# Patient Record
Sex: FEMALE | Race: BLACK OR AFRICAN AMERICAN | NOT HISPANIC OR LATINO | Employment: FULL TIME
[De-identification: names, ages, dates, MRNs, and addresses within clinical notes are randomized per-mention and may not be internally consistent; named-entity substitution may affect disease eponyms.]

---

## 2017-12-31 ENCOUNTER — HEALTH MAINTENANCE LETTER (OUTPATIENT)
Age: 27
End: 2017-12-31

## 2020-08-31 ENCOUNTER — HOSPITAL ENCOUNTER (INPATIENT)
Facility: CLINIC | Age: 30
LOS: 4 days | Discharge: HOME | End: 2020-09-04
Attending: EMERGENCY MEDICINE | Admitting: PSYCHIATRY & NEUROLOGY
Payer: MEDICAID

## 2020-08-31 ENCOUNTER — TELEPHONE (OUTPATIENT)
Dept: BEHAVIORAL HEALTH | Facility: CLINIC | Age: 30
End: 2020-08-31

## 2020-08-31 DIAGNOSIS — R45.851 SUICIDAL IDEATION: ICD-10-CM

## 2020-08-31 DIAGNOSIS — Z03.818 ENCNTR FOR OBS FOR SUSP EXPSR TO OTH BIOLG AGENTS RULED OUT: ICD-10-CM

## 2020-08-31 DIAGNOSIS — F10.120 ALCOHOL ABUSE WITH UNCOMPLICATED INTOXICATION (H): ICD-10-CM

## 2020-08-31 DIAGNOSIS — F39 MILD MOOD DISORDER (H): ICD-10-CM

## 2020-08-31 DIAGNOSIS — F10.920 ALCOHOLIC INTOXICATION WITHOUT COMPLICATION (H): ICD-10-CM

## 2020-08-31 LAB
ALCOHOL BREATH TEST: 0.29 (ref 0–0.01)
AMPHETAMINES UR QL SCN: NEGATIVE
BARBITURATES UR QL: NEGATIVE
BENZODIAZ UR QL: NEGATIVE
CANNABINOIDS UR QL SCN: NEGATIVE
COCAINE UR QL: POSITIVE
ETHANOL UR QL SCN: POSITIVE
HCG UR QL: NEGATIVE
LABORATORY COMMENT REPORT: NORMAL
OPIATES UR QL SCN: NEGATIVE
SARS-COV-2 RNA SPEC QL NAA+PROBE: NEGATIVE
SARS-COV-2 RNA SPEC QL NAA+PROBE: NORMAL
SPECIMEN SOURCE: NORMAL
SPECIMEN SOURCE: NORMAL

## 2020-08-31 PROCEDURE — HZ2ZZZZ DETOXIFICATION SERVICES FOR SUBSTANCE ABUSE TREATMENT: ICD-10-PCS | Performed by: PSYCHIATRY & NEUROLOGY

## 2020-08-31 PROCEDURE — U0003 INFECTIOUS AGENT DETECTION BY NUCLEIC ACID (DNA OR RNA); SEVERE ACUTE RESPIRATORY SYNDROME CORONAVIRUS 2 (SARS-COV-2) (CORONAVIRUS DISEASE [COVID-19]), AMPLIFIED PROBE TECHNIQUE, MAKING USE OF HIGH THROUGHPUT TECHNOLOGIES AS DESCRIBED BY CMS-2020-01-R: HCPCS | Performed by: EMERGENCY MEDICINE

## 2020-08-31 PROCEDURE — 99285 EMERGENCY DEPT VISIT HI MDM: CPT | Mod: Z6 | Performed by: EMERGENCY MEDICINE

## 2020-08-31 PROCEDURE — 81025 URINE PREGNANCY TEST: CPT | Performed by: EMERGENCY MEDICINE

## 2020-08-31 PROCEDURE — 80320 DRUG SCREEN QUANTALCOHOLS: CPT | Performed by: EMERGENCY MEDICINE

## 2020-08-31 PROCEDURE — 90791 PSYCH DIAGNOSTIC EVALUATION: CPT

## 2020-08-31 PROCEDURE — 80307 DRUG TEST PRSMV CHEM ANLYZR: CPT | Performed by: EMERGENCY MEDICINE

## 2020-08-31 PROCEDURE — 12400001 ZZH R&B MH UMMC

## 2020-08-31 PROCEDURE — C9803 HOPD COVID-19 SPEC COLLECT: HCPCS | Performed by: EMERGENCY MEDICINE

## 2020-08-31 PROCEDURE — 82075 ASSAY OF BREATH ETHANOL: CPT | Performed by: EMERGENCY MEDICINE

## 2020-08-31 PROCEDURE — 99285 EMERGENCY DEPT VISIT HI MDM: CPT | Mod: 25 | Performed by: EMERGENCY MEDICINE

## 2020-08-31 RX ORDER — TRAZODONE HYDROCHLORIDE 50 MG/1
50 TABLET, FILM COATED ORAL
Status: DISCONTINUED | OUTPATIENT
Start: 2020-08-31 | End: 2020-09-04 | Stop reason: HOSPADM

## 2020-08-31 RX ORDER — LANOLIN ALCOHOL/MO/W.PET/CERES
100 CREAM (GRAM) TOPICAL DAILY
Status: DISCONTINUED | OUTPATIENT
Start: 2020-09-01 | End: 2020-09-04 | Stop reason: HOSPADM

## 2020-08-31 RX ORDER — BISACODYL 10 MG
10 SUPPOSITORY, RECTAL RECTAL DAILY PRN
Status: DISCONTINUED | OUTPATIENT
Start: 2020-08-31 | End: 2020-09-04 | Stop reason: HOSPADM

## 2020-08-31 RX ORDER — ALUMINA, MAGNESIA, AND SIMETHICONE 2400; 2400; 240 MG/30ML; MG/30ML; MG/30ML
30 SUSPENSION ORAL EVERY 4 HOURS PRN
Status: DISCONTINUED | OUTPATIENT
Start: 2020-08-31 | End: 2020-09-04 | Stop reason: HOSPADM

## 2020-08-31 RX ORDER — MULTIPLE VITAMINS W/ MINERALS TAB 9MG-400MCG
1 TAB ORAL DAILY
Status: DISCONTINUED | OUTPATIENT
Start: 2020-09-01 | End: 2020-09-04 | Stop reason: HOSPADM

## 2020-08-31 RX ORDER — LANOLIN ALCOHOL/MO/W.PET/CERES
100 CREAM (GRAM) TOPICAL DAILY
Status: CANCELLED | OUTPATIENT
Start: 2020-08-31

## 2020-08-31 RX ORDER — LORAZEPAM 1 MG/1
1-4 TABLET ORAL EVERY 30 MIN PRN
Status: DISCONTINUED | OUTPATIENT
Start: 2020-08-31 | End: 2020-09-03

## 2020-08-31 RX ORDER — ONDANSETRON 4 MG/1
4 TABLET, ORALLY DISINTEGRATING ORAL EVERY 6 HOURS PRN
Status: DISCONTINUED | OUTPATIENT
Start: 2020-08-31 | End: 2020-09-04 | Stop reason: HOSPADM

## 2020-08-31 RX ORDER — OLANZAPINE 10 MG/2ML
10 INJECTION, POWDER, FOR SOLUTION INTRAMUSCULAR
Status: DISCONTINUED | OUTPATIENT
Start: 2020-08-31 | End: 2020-09-04 | Stop reason: HOSPADM

## 2020-08-31 RX ORDER — HYDROXYZINE HYDROCHLORIDE 25 MG/1
25 TABLET, FILM COATED ORAL EVERY 4 HOURS PRN
Status: DISCONTINUED | OUTPATIENT
Start: 2020-08-31 | End: 2020-09-04 | Stop reason: HOSPADM

## 2020-08-31 RX ORDER — MULTIPLE VITAMINS W/ MINERALS TAB 9MG-400MCG
1 TAB ORAL DAILY
Status: CANCELLED | OUTPATIENT
Start: 2020-08-31

## 2020-08-31 RX ORDER — ACETAMINOPHEN 325 MG/1
650 TABLET ORAL EVERY 4 HOURS PRN
Status: CANCELLED | OUTPATIENT
Start: 2020-08-31

## 2020-08-31 RX ORDER — DIAZEPAM 5 MG
5-20 TABLET ORAL EVERY 30 MIN PRN
Status: CANCELLED | OUTPATIENT
Start: 2020-08-31

## 2020-08-31 RX ORDER — FOLIC ACID 1 MG/1
1 TABLET ORAL DAILY
Status: CANCELLED | OUTPATIENT
Start: 2020-08-31

## 2020-08-31 RX ORDER — OLANZAPINE 10 MG/1
10 TABLET ORAL
Status: DISCONTINUED | OUTPATIENT
Start: 2020-08-31 | End: 2020-09-04 | Stop reason: HOSPADM

## 2020-08-31 RX ORDER — FOLIC ACID 1 MG/1
1 TABLET ORAL DAILY
Status: DISCONTINUED | OUTPATIENT
Start: 2020-09-01 | End: 2020-09-04 | Stop reason: HOSPADM

## 2020-08-31 ASSESSMENT — ACTIVITIES OF DAILY LIVING (ADL)
TOILETING: 0-->INDEPENDENT
SWALLOWING: 0-->SWALLOWS FOODS/LIQUIDS WITHOUT DIFFICULTY
RETIRED_EATING: 0-->INDEPENDENT
DRESS: 0-->INDEPENDENT
RETIRED_COMMUNICATION: 0-->UNDERSTANDS/COMMUNICATES WITHOUT DIFFICULTY
BATHING: 0-->INDEPENDENT

## 2020-08-31 ASSESSMENT — ENCOUNTER SYMPTOMS
VOMITING: 0
DIFFICULTY URINATING: 0
NECK PAIN: 0
NERVOUS/ANXIOUS: 0
FEVER: 0
HEADACHES: 0
NAUSEA: 0
SHORTNESS OF BREATH: 0
BACK PAIN: 0
ABDOMINAL PAIN: 0
CHILLS: 0

## 2020-08-31 ASSESSMENT — MIFFLIN-ST. JEOR: SCORE: 1394.04

## 2020-08-31 NOTE — ED NOTES
ED to Behavioral Floor Handoff    SITUATION  Isha Bloom is a 30 year old female who speaks English and lives in a home with others The patient arrived in the ED by private car from friend's home with a complaint of Suicidal (Ideation and plan)  .The patient's current symptoms started/worsened 4 month(s) ago and during this time the symptoms have increased.   In the ED, pt was diagnosed with   Final diagnoses:   Alcoholic intoxication without complication (H)   Suicidal ideation        Initial vitals were: BP: 114/67  Pulse: 78  Resp: 16  Weight: 66.8 kg (147 lb 3.2 oz)  SpO2: 95 %   --------  Is the patient diabetic? No   If yes, last blood glucose? --     If yes, was this treated in the ED? --  --------  Is the patient inebriated (ETOH) Yes or Impaired on other substances? No  MSSA done? Yes  Last MSSA score: 3    Were withdrawal symptoms treated? No  Does the patient have a seizure history? No. If yes, date of most recent seizure--  --------  Is the patient patient experiencing suicidal ideation? reports the following suicide factors: pt has a plan but states that she can contract for safety    Homicidal ideation? denies current or recent homicidal ideation or behaviors.    Self-injurious behavior/urges? denies current or recent self injurious behavior or ideation.  ------  Was pt aggressive in the ED No  Was a code called No  Is the pt now cooperative? Yes  -------  Meds given in ED: Medications - No data to display   Family present during ED course? Yes  Family currently present? No    BACKGROUND  Does the patient have a cognitive impairment or developmental disability? No  Allergies: No Known Allergies.   Social demographics are   Social History     Socioeconomic History     Marital status: Single     Spouse name: None     Number of children: None     Years of education: None     Highest education level: None   Occupational History     None   Social Needs     Financial resource strain: None     Food  insecurity     Worry: None     Inability: None     Transportation needs     Medical: None     Non-medical: None   Tobacco Use     Smoking status: Former Smoker     Smokeless tobacco: Never Used   Substance and Sexual Activity     Alcohol use: Yes     Comment: 2-3 drinks per day     Drug use: Yes     Frequency: 2.0 times per week     Types: Cocaine, Marijuana     Comment: cocaine and marijuana in past 48 hours,  none today     Sexual activity: Yes   Lifestyle     Physical activity     Days per week: None     Minutes per session: None     Stress: None   Relationships     Social connections     Talks on phone: None     Gets together: None     Attends [employer] service: None     Active member of club or organization: None     Attends meetings of clubs or organizations: None     Relationship status: None     Intimate partner violence     Fear of current or ex partner: None     Emotionally abused: None     Physically abused: None     Forced sexual activity: None   Other Topics Concern     None   Social History Narrative     None        ASSESSMENT  Labs results   Labs Ordered and Resulted from Time of ED Arrival Up to the Time of Departure from the ED   DRUG ABUSE SCREEN 6 CHEM DEP URINE ([facility]) - Abnormal; Notable for the following components:       Result Value    Cocaine Qual Urine Positive (*)     Ethanol Qual Urine Positive (*)     All other components within normal limits   ALCOHOL BREATH TEST POCT - Abnormal; Notable for the following components:    Alcohol Breath Test 0.291 (*)     All other components within normal limits   HCG QUALITATIVE URINE   COVID-19 VIRUS (CORONAVIRUS) BY PCR   SARS-COV-2 (COVID-19) VIRUS RT-PCR      Imaging Studies: No results found for this or any previous visit (from the past 24 hour(s)).   Most recent vital signs BP 95/55   Pulse 71   Resp 16   Wt 66.8 kg (147 lb 3.2 oz)   LMP 07/27/2020   SpO2 95%   BMI 24.50 kg/m     Abnormal labs/tests/findings requiring intervention:---   Pain  control: pt had none  Nausea control: pt had none    RECOMMENDATION  Are any infection precautions needed (MRSA, VRE, etc.)? No If yes, what infection? --  ---  Does the patient have mobility issues? independently. If yes, what device does the pt use? ---  ---  Is patient on 72 hour hold or commitment? No If on 72 hour hold, have hold and rights been given to patient? N/A  Are admitting orders written if after 10 p.m. ?N/A  Tasks needing to be completed:---     Glenna Cox, RN   ascom-- vocera   [phone] [facility]   [phone] [facility]

## 2020-08-31 NOTE — ED PROVIDER NOTES
[facility] EMERGENCY DEPARTMENT ([facility])     August 31, 2020  History     Chief Complaint   Patient presents with     Suicidal     Ideation and plan     HPI  Isha Bloom is a 30 year old female with a medical history significant for pyelonephritis and alcohol abuse who presents the ED today complaining of suicidal ideation.  Patient states she has had worsening depression over the past several weeks.  She is also drinking alcohol almost daily and using cocaine.  He does not have a concrete plan, but states she may drink herself to death.    On arrival, she is intoxicated, last drink approximately 1 hour prior to arrival.  She denies any homicidal ideations, hallucinations, headache, confusion, and has no other medical complaints.    I have reviewed the Medications, Allergies, Past Medical and Surgical History, and Social History in the Pounce system.  PAST MEDICAL HISTORY: History reviewed. No pertinent past medical history.    PAST SURGICAL HISTORY: History reviewed. No pertinent surgical history.    Past medical history, past surgical history, medications, and allergies were reviewed with the patient. Additional pertinent items: None    FAMILY HISTORY: History reviewed. No pertinent family history.    SOCIAL HISTORY:   Social History     Tobacco Use     Smoking status: Former Smoker     Smokeless tobacco: Never Used   Substance Use Topics     Alcohol use: Yes     Comment: 2-3 drinks per day     Social history was reviewed with the patient. Additional pertinent items: None      Patient's Medications   New Prescriptions    No medications on file   Previous Medications    No medications on file   Modified Medications    No medications on file   Discontinued Medications    METHYLPHENIDATE HCL (CONCERTA PO)    Take 36 mg by mouth        No Known Allergies     Review of Systems   Constitutional: Negative for chills and fever.   Respiratory: Negative for shortness of breath.    Cardiovascular: Negative for  chest pain.   Gastrointestinal: Negative for abdominal pain, nausea and vomiting.   Genitourinary: Negative for difficulty urinating.   Musculoskeletal: Negative for back pain and neck pain.   Neurological: Negative for headaches.   Psychiatric/Behavioral: Positive for suicidal ideas. The patient is not nervous/anxious.      A complete review of systems was performed with pertinent positives and negatives noted in the HPI, and all other systems negative.    Physical Exam   BP: 114/67  Pulse: 78  Resp: 16  Weight: 66.8 kg (147 lb 3.2 oz)  SpO2: 95 %      Physical Exam  Vitals signs and nursing note reviewed.   Constitutional:       General: She is not in acute distress.     Appearance: Normal appearance.      Comments: Clinically intoxicated, etoh smell     HENT:      Head: Normocephalic.      Nose: Nose normal.   Eyes:      Pupils: Pupils are equal, round, and reactive to light.   Neck:      Musculoskeletal: Normal range of motion.   Cardiovascular:      Rate and Rhythm: Normal rate and regular rhythm.   Pulmonary:      Effort: Pulmonary effort is normal.   Abdominal:      General: There is no distension.   Musculoskeletal: Normal range of motion.         General: No deformity.   Skin:     General: Skin is warm.   Neurological:      Mental Status: She is alert and oriented to person, place, and time.   Psychiatric:         Attention and Perception: Attention normal.         Mood and Affect: Mood is depressed.         Speech: Speech is slurred.         Behavior: Behavior is cooperative.         Thought Content: Thought content includes suicidal ideation. Thought content includes suicidal plan.         ED Course               Results for orders placed or performed during the hospital encounter of 08/31/20 (from the past 24 hour(s))   Alcohol breath test POCT   Result Value Ref Range    Alcohol Breath Test 0.291 (A) 0.00 - 0.01   Drug abuse screen 6 urine (tox)   Result Value Ref Range    Amphetamine Qual Urine  Negative NEG^Negative    Barbiturates Qual Urine Negative NEG^Negative    Benzodiazepine Qual Urine Negative NEG^Negative    Cannabinoids Qual Urine Negative NEG^Negative    Cocaine Qual Urine Positive (A) NEG^Negative    Ethanol Qual Urine Positive (A) NEG^Negative    Opiates Qualitative Urine Negative NEG^Negative   HCG qualitative urine   Result Value Ref Range    HCG Qual Urine Negative NEG^Negative     Medications - No data to display          Assessments & Plan (with Medical Decision Making)   Patient presents intoxicated on alcohol with suicidal thoughts.  Plan to drink herself to death.  On arrival, blood alcohol level is 0.291.  UDS positive for alcohol and cocaine.  Patient will need to sober while in the emergency department.  Anticipate mental health assessment in the morning.  Will sign out to the a.m. provider.    I have reviewed the nursing notes.    I have reviewed the findings, diagnosis, plan and need for follow up with the patient.    New Prescriptions    No medications on file       Final diagnoses:   Alcoholic intoxication without complication (H)   Suicidal ideation       8/31/2020   [facility], [place], EMERGENCY DEPARTMENT     Sajan Brandt DO  09/01/20 0103

## 2020-08-31 NOTE — PHARMACY-ADMISSION MEDICATION HISTORY
Admission Medication History Completed by Pharmacy    See [facility] Admission Navigator for allergy information, preferred outpatient pharmacy, prior to admission medications and immunization status.     Medication history sources:  patient via iPad interview    Changes made to PTA medication list (reason)  Added: none  Deleted: none  Changed: none    Additional medication history information:   - Patient denies taking/being prescribed prescription medications and over-the-counter (OTC) products such as vitamins, sleep aids, etc.    - patient reports finishing an antibiotic course ~3 weeks ago for an infection she had on her hand.    Prior to Admission medications    Not on File     Date completed: 08/31/20    Medication history completed by:   Marshall Gray, uGstavo  [facility]  Emergency Department: Ascom *[phone]

## 2020-08-31 NOTE — ED NOTES
Assumed care of pt.  Report received from BRIDGET Manzanares.  Pt sleeping.  Respirations easy, regular, non-labored.  Skin w/d, no discoloration.

## 2020-08-31 NOTE — ED NOTES
Pt arrived from triage to ED hallway. Searched by PA and personal belongings placed in secure locker. Pt informed what to expect while in the ED. Pt acknowledged to understanding process. Due to reporting SI, 1:1 started.
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
## 2020-09-01 LAB
ALBUMIN SERPL-MCNC: 3.4 G/DL (ref 3.4–5)
ALP SERPL-CCNC: 58 U/L (ref 40–150)
ALT SERPL W P-5'-P-CCNC: 19 U/L (ref 0–50)
ANION GAP SERPL CALCULATED.3IONS-SCNC: 7 MMOL/L (ref 3–14)
AST SERPL W P-5'-P-CCNC: 23 U/L (ref 0–45)
BILIRUB SERPL-MCNC: 0.7 MG/DL (ref 0.2–1.3)
BUN SERPL-MCNC: 12 MG/DL (ref 7–30)
CALCIUM SERPL-MCNC: 8.8 MG/DL (ref 8.5–10.1)
CHLORIDE SERPL-SCNC: 103 MMOL/L (ref 94–109)
CHOLEST SERPL-MCNC: 210 MG/DL
CO2 SERPL-SCNC: 30 MMOL/L (ref 20–32)
CREAT SERPL-MCNC: 0.92 MG/DL (ref 0.52–1.04)
DEPRECATED CALCIDIOL+CALCIFEROL SERPL-MC: 20 UG/L (ref 20–75)
ERYTHROCYTE [DISTWIDTH] IN BLOOD BY AUTOMATED COUNT: 12.7 % (ref 10–15)
GFR SERPL CREATININE-BSD FRML MDRD: 84 ML/MIN/{1.73_M2}
GLUCOSE SERPL-MCNC: 81 MG/DL (ref 70–99)
HCT VFR BLD AUTO: 38.3 % (ref 35–47)
HDLC SERPL-MCNC: 65 MG/DL
HGB BLD-MCNC: 12.8 G/DL (ref 11.7–15.7)
LDLC SERPL CALC-MCNC: 116 MG/DL
MCH RBC QN AUTO: 32 PG (ref 26.5–33)
MCHC RBC AUTO-ENTMCNC: 33.4 G/DL (ref 31.5–36.5)
MCV RBC AUTO: 96 FL (ref 78–100)
NONHDLC SERPL-MCNC: 145 MG/DL
PLATELET # BLD AUTO: 244 10E9/L (ref 150–450)
POTASSIUM SERPL-SCNC: 3.7 MMOL/L (ref 3.4–5.3)
PROT SERPL-MCNC: 7.1 G/DL (ref 6.8–8.8)
RBC # BLD AUTO: 4 10E12/L (ref 3.8–5.2)
SODIUM SERPL-SCNC: 140 MMOL/L (ref 133–144)
TRIGL SERPL-MCNC: 147 MG/DL
TSH SERPL DL<=0.005 MIU/L-ACNC: 2.42 MU/L (ref 0.4–4)
VIT B12 SERPL-MCNC: 421 PG/ML (ref 193–986)
WBC # BLD AUTO: 2.7 10E9/L (ref 4–11)

## 2020-09-01 PROCEDURE — 80053 COMPREHEN METABOLIC PANEL: CPT | Performed by: NURSE PRACTITIONER

## 2020-09-01 PROCEDURE — 12400001 ZZH R&B MH UMMC

## 2020-09-01 PROCEDURE — 80061 LIPID PANEL: CPT | Performed by: NURSE PRACTITIONER

## 2020-09-01 PROCEDURE — 85027 COMPLETE CBC AUTOMATED: CPT | Performed by: NURSE PRACTITIONER

## 2020-09-01 PROCEDURE — 82607 VITAMIN B-12: CPT | Performed by: NURSE PRACTITIONER

## 2020-09-01 PROCEDURE — 84443 ASSAY THYROID STIM HORMONE: CPT | Performed by: NURSE PRACTITIONER

## 2020-09-01 PROCEDURE — 82306 VITAMIN D 25 HYDROXY: CPT | Performed by: NURSE PRACTITIONER

## 2020-09-01 PROCEDURE — G0177 OPPS/PHP; TRAIN & EDUC SERV: HCPCS

## 2020-09-01 PROCEDURE — 36415 COLL VENOUS BLD VENIPUNCTURE: CPT | Performed by: NURSE PRACTITIONER

## 2020-09-01 PROCEDURE — 25000132 ZZH RX MED GY IP 250 OP 250 PS 637: Performed by: STUDENT IN AN ORGANIZED HEALTH CARE EDUCATION/TRAINING PROGRAM

## 2020-09-01 PROCEDURE — 99223 1ST HOSP IP/OBS HIGH 75: CPT | Mod: AI | Performed by: PSYCHIATRY & NEUROLOGY

## 2020-09-01 RX ORDER — ESCITALOPRAM OXALATE 20 MG/1
20 TABLET ORAL DAILY
Status: DISCONTINUED | OUTPATIENT
Start: 2020-09-01 | End: 2020-09-02

## 2020-09-01 RX ADMIN — THIAMINE HCL TAB 100 MG 100 MG: 100 TAB at 09:43

## 2020-09-01 RX ADMIN — MULTIPLE VITAMINS W/ MINERALS TAB 1 TABLET: TAB at 09:43

## 2020-09-01 RX ADMIN — FOLIC ACID 1 MG: 1 TABLET ORAL at 09:43

## 2020-09-01 RX ADMIN — ESCITALOPRAM OXALATE 20 MG: 20 TABLET ORAL at 11:31

## 2020-09-01 ASSESSMENT — ACTIVITIES OF DAILY LIVING (ADL)
DRESS: SCRUBS (BEHAVIORAL HEALTH)
DRESS: SCRUBS (BEHAVIORAL HEALTH)
HYGIENE/GROOMING: INDEPENDENT
HYGIENE/GROOMING: INDEPENDENT
ORAL_HYGIENE: INDEPENDENT
LAUNDRY: WITH SUPERVISION
LAUNDRY: WITH SUPERVISION
ORAL_HYGIENE: INDEPENDENT

## 2020-09-01 NOTE — PROGRESS NOTES
Pt attended groups this shift and denies suicidal ideations, hallucinations, or SIB. She reported having stressors,..financial issues, living situations and loosing employment due to covid 19. She appears cooperative and pleasant on approach. She wants treatment but has to be outpatient treatment (CD) or joining  AA meetings in the community. She was observed on the phone with her father. She expressed depression and anxiety.      09/01/20 1106   Behavioral Health   Hallucinations denies / not responding to hallucinations   Thinking intact   Orientation time: oriented;date: oriented;place: oriented;person: oriented   Memory baseline memory   [facility] admits / accepts   Judgement impaired   Eye Contact at examiner   Affect full range affect   Mood depressed;anxious   Physical Appearance/Attire appears stated age   Hygiene well groomed   Suicidality other (see comments)  (Denies)   1. Wish to be Dead (Recent) No   2. Non-Specific Active Suicidal Thoughts (Recent) No   Self Injury other (see comment)  (Denies)   Speech coherent;clear   Medication Sensitivity no stated side effects   Psychomotor / Gait balanced;steady   Psycho Education   Type of Intervention 1:1 intervention   Response participates, initiates socially appropriate   Hours 0.5   Activities of Daily Living   Hygiene/Grooming independent   Oral Hygiene independent   Dress scrubs (behavioral health)   Laundry with supervision   Room Organization independent   Activity   Activity Assistance Provided independent

## 2020-09-01 NOTE — PROGRESS NOTES
08/31/20 6261   Patient Belongings   Did you bring any home meds/supplements to the hospital?  No   Patient Belongings locker;sent to security per site process   Belongings Search Yes   Clothing Search Yes   Second Staff Le (PA From [employer])     Belongings:  Face mask in zipp lock bag   Rejuveniqe   Lip care  Keys  One silver earring  Cell phone in green case   Peach sandals  Black bra   Brown bag  Russell x3  Toothbrush   Tooth paste  Mails x7  Black face mask    Sent to Security: Env # [id number]  $3 in cash   0.14 cents (in loose chance)  [place] 's License   Visa Debit [id number]    A               Admission:  I am responsible for any personal items that are not sent to the safe or pharmacy.  [facility] is not responsible for loss, theft or damage of any property in my possession.    Signature:  _________________________________ Date: _______  Time: _____                                              Staff Signature:  ____________________________ Date: ________  Time: _____      2nd Staff person, if patient is unable/unwilling to sign:    Signature: ________________________________ Date: ________  Time: _____     Discharge:  [facility] has returned all of my personal belongings:    Signature: _________________________________ Date: ________  Time: _____                                          Staff Signature:  ____________________________ Date: ________  Time: _____
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
## 2020-09-01 NOTE — PLAN OF CARE
BEHAVIORAL TEAM DISCUSSION    Participants:   Dr. Thayer, Dr. Danielson, Dr. Larios, Francisco Javier Shelton RN, Ramona Mayfield MA.LP, Med student, Pharm Student    Anticipated length of stay:   3-5 days    Continued Stay Criteria/Rationale:   Worsening depression/SI, alcohol intoxication    Medical/Physical:   No pertinent medical history    Precautions:   Behavioral Orders   Procedures     Code 1 - Restrict to Unit     Routine Programming     As clinically indicated     Status 15     Every 15 minutes.     Suicide precautions     Patients on Suicide Precautions should have a Combination Diet ordered that includes a Diet selection(s) AND a Behavioral Tray selection for Safe Tray - with utensils, or Safe Tray - NO utensils       Withdrawal precautions     Plan:  Patient will have ongoing psychiatric assessment.    Patient will be monitored closely for alcohol withdrawal.  Treatment options  will be discussed/ offered per MD as indicated.    Patient will be referred to business office for MA application.  She will need referrals for Psychiatry.  Patient will be be offered CD assessment for funding /placement for treatment.  Hospital staff will provide a safe environment and a therapeutic milieu. Patient will be encouraged to participate in unit groups and activities.   CTC will continue to assess needs and  ensure appropriate follow up care is in place.       Rationale for change in precautions or plan:   No change in plan/precautions

## 2020-09-01 NOTE — PROGRESS NOTES
Pt was admitted to unit for having SI with a plan to jump off a bridge.  She reports her recent stressors are loosing a job, stressful living situation  She reports still feeling anxious and depressed but was able to contract for safety.
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
## 2020-09-01 NOTE — PROGRESS NOTES
"    ----------------------------------------------------------------------------------------------------------  [facility], [place]   Psychiatric Progress Note  Hospital Day #1     Interim History:   The patient's care was discussed with the treatment team and chart notes were reviewed.    Sleep 7 hours (09/01/20 0600)  Scheduled Medications: took all scheduled medications as prescribed   PRN medications: no PRNs given     Staff Report:   Patient is pleasant with staff. Pt understands impact of financial and relationship stressors, as well as substance use on her mental health. Eager to develop coping skills and address substance use.     Patient Interview:   Isha Bloom was interviewed remotely over Zoom per COVID protocols.     Patient reports feeling \"neutral\" today. Pt feels groggy and reports waking up a few times over the night. She reported to the ER yesterday evening (8/31/2020) with thoughts of suicide and felt she needed help with her mood, alcohol use, and substance use.     Pt reports feeling down from stressors related to work and finances. Pt thinks she uses alcohol and other substances to manage her mood. Pt reports using cocaine on the weekends with friends and has used for the past 2 years. Sometimes pt gets anxious and experiences panic attacks after cocaine use and will use alcohol to cope. Patient uses ectasy \"whenever my friends or I get our hands on it.\" Pt denies feelings of withdrawal other than fatigue.     Pt does not have thoughts of suicide today. Pt is hopeful to learn coping mechanisms and address her substance use. Pt doesn't want to use alcohol and substances to manage mood. Pt's biological mother had alcohol use disorder and used while pt was in the womb. Pt cites this as another reason to get sober.        The risks, benefits, alternatives and side effects of any medication changes have been discussed and are understood by the patient and other " "caregivers.    Review of systems:     ROS was negative unless noted above.          Allergies:   No Known Allergies         Psychiatric Examination:   /69   Pulse 61   Temp 98.5  F (36.9  C) (Oral)   Resp 16   Ht 1.651 m (5' 5\")   Wt 66.3 kg (146 lb 3.2 oz)   LMP 07/27/2020   SpO2 98%   Breastfeeding No   BMI 24.33 kg/m    Weight is 146 lbs 3.2 oz  Body mass index is 24.33 kg/m .    MENTAL STATUS EXAM    Appearance:  normal posture, normal gait, well-developed, well-nourished, appears stated age, good hygiene and appropriately dressed  Attitude:  cooperative, engaged and friendly  Psychomotor:  no evidence of tics, dystonia, or tardive dyskinesia  Eye Contact: appropriate  Speech:  fluent English  Mood: \"neutral\"  Affect:  congruent, appropriate and apathetic; reactive (smiles/laughs at jokes/affirmations)  Thought Content: Denies suicidal ideation  Thought Process: linear, coherent and goal directed  Sensorium: awake and alert  Cognition: memory grossly intact, good concentration, good language ability, good attention span and good intelligence  Impulse control: fair  Insight: fair  Judgment: fair         Labs:     Recent Results (from the past 24 hour(s))   Asymptomatic COVID-19 Virus (Coronavirus) by PCR    Collection Time: 08/31/20  9:46 AM    Specimen: Nasopharyngeal   Result Value Ref Range    COVID-19 Virus PCR to [facility] - Source Nasopharyngeal     COVID-19 Virus PCR to [facility] - Result       Test received-See reflex to [facility]DL test SARS CoV2 (COVID-19) Virus RT-PCR   SARS-CoV-2 COVID-19 Virus (Coronavirus) RT-PCR Nasopharyngeal    Collection Time: 08/31/20  9:46 AM    Specimen: Nasopharyngeal   Result Value Ref Range    SARS-CoV-2 Virus Specimen Source Nasopharyngeal     SARS-CoV-2 PCR Result NEGATIVE     SARS-CoV-2 PCR Comment       Testing was performed using the Xpert Xpress SARS-CoV-2 Assay on the Cepheid Gene-Xpert   Instrument Systems. Additional information about this Emergency Use " Authorization (EUA)   assay can be found via the Lab Guide.          Assessment    Principal Diagnosis:   #Major Depressive Disorder r/o Substance Induced Depressive Disorder    Secondary psychiatric diagnoses of concern this admission:   # Alcohol Use Disorder  # Polysubstance Use   # r/o Generalized Anxiety Disorder    Diagnostic Impression:   Isha Bloom is a 30 year old female with no previous psychiatric history. Patient presented to the ER 8/31/2020 with suicidal ideation in the context of psychosocial stressors and polysubstance use. Isha reported depressed mood, sleep disturbances, loss of interest, and trouble concentrating along with suicidal ideation and panic attacks, which she attributes to financial and social stressors; thus far she has attempted to cope with these symptoms by drinking alcohol and using other substances including cocaine. Biologic factors contributing to her presentation include family history of addictive behavior as well as ongoing substance use. Psychosocial factors contributing to this presentation include recent job loss and ongoing pandemic/lockdown.     Her presentation is consistent with major depressive disorder with concomitant polysubstance use disorder.       Psychiatric Hospital course:   Isha Bloom was [facility] to [facility] as a voluntary patient. Patient presented with depressive symptoms and suicidal ideation. She was started on 20mg escitalopram PO every day for mood symptoms and monitored for side effects/tolerability.     Medical course   Patient was medically cleared for admission to [facility]. Patient has been followed with [facility] protocol and received a score of 2 on 9/1/2020.     Data:   - Alcohol breath test on 8/31/2020 was 0.291  - Urine toxicology on 8/31/2020 was positive for cocaine and ethanol   - CBC with platelets, taken 9/1/2020, was normal  - TSH, taken 9/1/2020, was normal  - Vitamin B12 level, taken 9/1/2020, was normal  - CMP, taken  9/1/2020 was normal.     Consults:   None.    Plan     Today's Changes:  - Start 20 mg escitalopram PO QD  - Continue [facility] protocol  - discussed MICD treatment, patient considering    Psychotropic Medications:  Scheduled Psych Medications:  - 20 mg excitalopram PO QD    PRN Psych Medications  - Hydroxyzine 25 mg PRN Q4H  - Olanzapine 10 mg PO/IM prn Q2H severe agitation/psychosis  - Trazodone 50 mg PRN at bedtime  - Lorasepam 4 mg PRN Every 30 minutes    Patient will be treated in therapeutic milieu with appropriate individual and group therapies as described.    Medical diagnoses to be addressed this admission:    # Alcohol withdrawal  - [facility] protocol    Legal Status:   Orders Placed This Encounter      Voluntary      Safety Assessment:   Behavioral Orders   Procedures     Code 1 - Restrict to Unit     Routine Programming     As clinically indicated     Status 15     Every 15 minutes.     Suicide precautions     Patients on Suicide Precautions should have a Combination Diet ordered that includes a Diet selection(s) AND a Behavioral Tray selection for Safe Tray - with utensils, or Safe Tray - NO utensils       Withdrawal precautions       Disposition: Patient presented with acute exacerbated depression requiring inpatient observation and management. Discharge destination to be determined; considering discharge to home with outpatient follow-up vs higher level of care pending clinical improvement and stabalization.     The patient was seen and the plan was discussed with the attending physician.     Nadia Vang, MS3      ---------------------------------------------------------------------------------------------------------------------  I was present with the medical student who participated in the service and in the documentation of the note. I have verified the history and personally performed the physical exam and medical decision making. I agree with the assessment and plan of care as documented in the  note.    Isa Danielson MD  PGY1 Psychiatry      Psychiatry Attending Attestation:   I have reviewed the resident admit note and confirmed by my exam today the HPI, past psych, PMH, ROS, meds, allergies, family and social histories.  I have also reviewed all available labs and vital signs.  I, Jeffrey Thayer, saw and evaluated the patient with the resident physician.  I agree with the findings and plan of care as documented in the resident note.  I have reviewed all labs and vital signs.

## 2020-09-01 NOTE — PLAN OF CARE
Problem: General Plan of Care (Inpatient Behavioral)  Goal: Individualization/Patient Specific Goal (IP Behavioral)  Description: The patient and/or their representative will achieve their patient-specific goals related to the plan of care.    The patient-specific goals include:  Flowsheets (Taken 9/1/2020 0888)  Patient Strengths:   Motivated and ready for change   Stable housing   Awareness of substance use issues  Note: Patient's goals:  Feel better    Plan for admission:  1. Stabilization of mood disorder symptoms  2. Absence of SI- safe with self  3. Medication management per MD's  4. Safe withdrawal from substances complete  5. Coordination of care with outpatient providers, family  6. CD assessment complete  7. Psychiatric follow up care in place

## 2020-09-02 PROCEDURE — G0177 OPPS/PHP; TRAIN & EDUC SERV: HCPCS

## 2020-09-02 PROCEDURE — 12400001 ZZH R&B MH UMMC

## 2020-09-02 PROCEDURE — 99232 SBSQ HOSP IP/OBS MODERATE 35: CPT | Mod: GC | Performed by: PSYCHIATRY & NEUROLOGY

## 2020-09-02 PROCEDURE — 25000132 ZZH RX MED GY IP 250 OP 250 PS 637: Performed by: STUDENT IN AN ORGANIZED HEALTH CARE EDUCATION/TRAINING PROGRAM

## 2020-09-02 PROCEDURE — 25000132 ZZH RX MED GY IP 250 OP 250 PS 637: Performed by: NURSE PRACTITIONER

## 2020-09-02 RX ORDER — ESCITALOPRAM OXALATE 5 MG/1
10 TABLET ORAL DAILY
Status: DISCONTINUED | OUTPATIENT
Start: 2020-09-03 | End: 2020-09-04 | Stop reason: HOSPADM

## 2020-09-02 RX ORDER — ACETAMINOPHEN 325 MG/1
650 TABLET ORAL EVERY 4 HOURS PRN
Status: DISCONTINUED | OUTPATIENT
Start: 2020-09-02 | End: 2020-09-04 | Stop reason: HOSPADM

## 2020-09-02 RX ADMIN — FOLIC ACID 1 MG: 1 TABLET ORAL at 09:28

## 2020-09-02 RX ADMIN — ESCITALOPRAM OXALATE 20 MG: 20 TABLET ORAL at 09:28

## 2020-09-02 RX ADMIN — ACETAMINOPHEN 650 MG: 325 TABLET, FILM COATED ORAL at 20:37

## 2020-09-02 RX ADMIN — THIAMINE HCL TAB 100 MG 100 MG: 100 TAB at 09:28

## 2020-09-02 RX ADMIN — HYDROXYZINE HYDROCHLORIDE 25 MG: 25 TABLET, FILM COATED ORAL at 20:21

## 2020-09-02 RX ADMIN — MULTIPLE VITAMINS W/ MINERALS TAB 1 TABLET: TAB at 09:28

## 2020-09-02 ASSESSMENT — ACTIVITIES OF DAILY LIVING (ADL)
LAUNDRY: WITH SUPERVISION
ORAL_HYGIENE: INDEPENDENT
DRESS: SCRUBS (BEHAVIORAL HEALTH)
HYGIENE/GROOMING: INDEPENDENT
ORAL_HYGIENE: INDEPENDENT
DRESS: INDEPENDENT;SCRUBS (BEHAVIORAL HEALTH)
LAUNDRY: WITH SUPERVISION

## 2020-09-02 NOTE — PLAN OF CARE
Pt has been visible in the milieu socializing w/peers/staff and has been pleasant, calm, and cooperative. Attended OT group. Denies SI/SIB or hallucinations. Pt discharge plan is to go to her mothers place and attend in outpatient CD tx program.

## 2020-09-02 NOTE — PROGRESS NOTES
"    ----------------------------------------------------------------------------------------------------------  [facility], [place]   Psychiatric Progress Note  Hospital Day #2     Interim History:   The patient's care was discussed with the treatment team and chart notes were reviewed.    Sleep 7 hours (09/02/20 0700)  Scheduled Medications: took all scheduled medications as prescribed   PRN medications: no PRNs given     Staff Report:   Isha was pleasant, engaged, and social with staff. Attended group sessions and participated in activities well. Had conversation with father on 9/1/2020, expressed anxiety and depression following. Intends to engage in treatment, but desires outpatient care.     Patient Interview:   Isha Bloom was interviewed remotely over Zoom per COVID protocols.     Isha reports feeling \"good\" today. Pt was able to connect with her adopted mother, who has helped \"planning out certain things and stuff\". Isha is considering moving in with her adopted mother in [place], at least for the time being and  \"see where we go from there\". Neeraj's adopted mother's house would be close to her PCP. Isha hopes that the treatment plan can include outpatient treatment and, perhaps, attending AA/NA meetings. Pt has not heard from the business office about applying for insurance and is going to work on Rule 25 documentation.    Pt reports waking up a few times over the night with \"racing thoughts\". Patient attributes this to anxiety about transition out of inpatient care and treatment plan going forward. Isha reports being able to sleep about two consecutive hours between these periods of racing thoughts. Pt reports headache and nausea. Unsure if these are a result of new medication, escitalipram, or the start of her menstrual period. Isha does not normally experience these symptoms with menstruation.     Isha does not have thoughts of killing herself or self harm. " "\"It is mostly anxiety right now.\"     The risks, benefits, alternatives and side effects of any medication changes have been discussed and are understood by the patient and other caregivers.    Review of systems:     ROS was negative unless noted above.          Allergies:   No Known Allergies         Psychiatric Examination:   /63 (BP Location: Left arm)   Pulse 62   Temp 98.5  F (36.9  C) (Oral)   Resp 16   Ht 1.651 m (5' 5\")   Wt 66.3 kg (146 lb 3.2 oz)   LMP 07/27/2020   SpO2 99%   Breastfeeding No   BMI 24.33 kg/m    Weight is 146 lbs 3.2 oz  Body mass index is 24.33 kg/m .    MENTAL STATUS EXAM    Appearance:  normal posture, normal gait, well-developed, well-nourished, appears stated age, good hygiene and appropriately dressed  Attitude:  cooperative, engaged and friendly  Psychomotor:  no evidence of tics, dystonia, or tardive dyskinesia  Eye Contact: appropriate  Speech:  fluent English  Mood: \"good\"  Affect:  congruent, appropriate and apathetic; reactive (smiles/laughs at jokes/affirmations)  Thought Content: denies suicidal ideation, denies thoughts of self harm  Thought Process: linear, coherent and goal directed  Sensorium: awake and alert  Cognition: memory grossly intact, good concentration, good language ability, good attention span and good intelligence  Impulse control: fair  Insight: fair  Judgment: fair         Labs:     No results found for this or any previous visit (from the past 24 hour(s)).     Assessment    Principal Diagnosis:   #Major Depressive Disorder r/o Substance Induced Depressive Disorder    Secondary psychiatric diagnoses of concern this admission:   # Alcohol Use Disorder  # Polysubstance Use   # r/o Generalized Anxiety Disorder    Diagnostic Impression:   Isha Bloom is a 30 year old female with no previous psychiatric history. Patient presented to the ER 8/31/2020 with suicidal ideation in the context of psychosocial stressors and polysubstance use. Isha " reported depressed mood, sleep disturbances, loss of interest, and trouble concentrating along with suicidal ideation and panic attacks, which she attributes to financial and social stressors; thus far she has attempted to cope with these symptoms by drinking alcohol and using other substances including cocaine. Biologic factors contributing to her presentation include family history of addictive behavior as well as ongoing substance use. Psychosocial factors contributing to this presentation include recent job loss and ongoing pandemic/lockdown.     Her presentation is consistent with major depressive disorder with concomitant polysubstance use disorder.       Psychiatric Hospital course:   Isha Bloom was [facility] as a voluntary patient. Patient presented with depressive symptoms and suicidal ideation. She was started on 20mg escitalopram PO every day for mood symptoms and monitored for side effects/tolerability. Patient experienced headaches and nausea as side effects, escitalopram was reduced to 10 mg PO every day on 9/2/2020.    Medical course   Patient was medically cleared for admission to [facility]. Patient has been followed with [facility] protocol and received a score of 2 on 9/1/2020, score of 1 on 9/2/2020. [facility] protocol discontinued on 9/2/2020.     Data:   - Alcohol breath test on 8/31/2020 was 0.291  - Urine toxicology on 8/31/2020 was positive for cocaine and ethanol   - CBC with platelets, taken 9/1/2020, was normal  - TSH, taken 9/1/2020, was normal  - Vitamin B12 level, taken 9/1/2020, was normal  - CMP, taken 9/1/2020 was normal.     Consults:   None.    Plan     Today's Changes:  - Decrease to 10 mg escitalopram PO every day and monitor side effects  - Discontinue [facility] protocol  - discussed MICD treatment, patient considering    Psychotropic Medications:  Scheduled Psych Medications:  - 10 mg excitalopram PO QD    PRN Psych Medications  - Hydroxyzine 25 mg PRN Q4H  - Olanzapine 10 mg  PO/IM prn Q2H severe agitation/psychosis  - Trazodone 50 mg PRN at bedtime  - Lorasepam 4 mg PRN Every 30 minutes    Patient will be treated in therapeutic milieu with appropriate individual and group therapies as described.    Medical diagnoses to be addressed this admission:    # Alcohol withdrawal  - [facility] protocol, discontinued 9/2/2020 following 48 hours low scores    Legal Status:   Orders Placed This Encounter      Voluntary      Safety Assessment:   Behavioral Orders   Procedures     Code 1 - Restrict to Unit     Routine Programming     As clinically indicated     Status 15     Every 15 minutes.     Suicide precautions     Patients on Suicide Precautions should have a Combination Diet ordered that includes a Diet selection(s) AND a Behavioral Tray selection for Safe Tray - with utensils, or Safe Tray - NO utensils       Withdrawal precautions       Disposition: Patient presented with acute exacerbated depression requiring inpatient observation and management. Discharge destination to be determined; considering discharge to home with outpatient follow-up vs higher level of care pending clinical improvement and stabalization.     The patient was seen and the plan was discussed with the attending physician.     Nadia Vang, MS3      ---------------------------------------------------------------------------------------------------------------------  I was present with the medical student who participated in the service and in the documentation of the note. I have verified the history and personally performed the physical exam and medical decision making. I agree with the assessment and plan of care as documented in the note.  Isa Danielson MD  PGY1 Psychiatry      Psychiatry Attending Attestation:  I, Jeffrey Thayer, saw and evaluated the patient with the resident physician.  I agree with the findings and plan of care as documented in the resident note.  I have reviewed all labs and vital signs.

## 2020-09-02 NOTE — PROGRESS NOTES
Daily CTC Note:    Work Completed:   The patient's care was discussed with the treatment team and chart notes were reviewed.   Patient met with team.  Reports not feeling well last night- unsure if it was from medication or other factors (menstrual)  Patient has been working on discharge plans.  She is planning to stay with her mother and would like to attend an outpatient CD treatment program.    Patient now has active MA.  Referrals were made for both Psychiatry and therapy at [facility].  Patient completed a Rule 25 in the community a mth ago.  She will contact them to request outpatient funding/referral.    Discharge plan or goal:   [facility]charge to [facility] in 1[facility]2 days  Outpatient psychiatry/therapy now in place.  Outpatient CD treatment.    Barriers to discharge:   Severity of depression. Medication mgmt per MD

## 2020-09-02 NOTE — PROGRESS NOTES
Pt was visible in the milieu this evening. Pt was pleasant when approached and was sociable with peers and staff. Pt was observed to be on the phone with family this evening. Pt denied any SI/SIB or hallucinations. Pt denied any thoughts of self harm. Pt reported that her anxiety and depression were somewhat high this evening around a 7/10 for both. Pt didn't take a shower this evening or take care of any ADL's. No concerns were observed or reported.        09/01/20 1952   Behavioral Health   Hallucinations denies / not responding to hallucinations   Thinking intact   Orientation person: oriented;place: oriented;date: oriented;time: oriented   Memory baseline memory   Insight admits / accepts   Judgement impaired   Eye Contact at examiner   Affect full range affect   Mood depressed;anxious   Physical Appearance/Attire appears stated age   Hygiene well groomed   Suicidality other (see comments)  (denies)   1. Wish to be Dead (Recent) No   2. Non-Specific Active Suicidal Thoughts (Recent) No   Self Injury other (see comment)  (denies)   Speech clear;coherent   Psychomotor / Gait balanced;steady   Psycho Education   Type of Intervention 1:1 intervention   Response participates, initiates socially appropriate   Hours 0.5   Treatment Detail Check in   Activities of Daily Living   Hygiene/Grooming independent   Oral Hygiene independent   Dress scrubs (behavioral health)   Laundry with supervision   Room Organization independent   Activity   Activity Assistance Provided independent

## 2020-09-02 NOTE — PROGRESS NOTES
09/02/20 1600   Occupational Therapy   Type of Intervention structured groups   Response Initiates, socially acceptable   Hours 1       Leisure exploration and sensory integration group offered for increased intrinsic motivation to engage in social, non-obligatory occupations, promote positive milieu interaction and variation of sensory experiences. Note: Group was held outside ([place]) with 2:1 staff. Pt Response: Pt verbally contracted for safety and safe return to unit. Full participation in guided relaxation practice and light conversation. Pleasant and congruent affect.

## 2020-09-02 NOTE — PROGRESS NOTES
09/01/20 1928   General Information   Date Initially Attended OT 09/01/20     Pt's first attendance in Occupational Therapy Clinic. Pt Response: Pleasant and engaged. I to initiate, gather materials, sequence and adjust to workspace demands as needed for a gardening-based project. Demonstrated good focus, planning, and problem solving. Able to ask for assistance as needed and appeared comfortable in the presence of peers and staff.     OT staff will meet with pt to review the role of occupational therapy and explain the value of having them involved in their treatment plan including options to meet current needs/self-identified goals. As group attendance is established,  continued clinical observations will be made and Pt will be given self-assessment to inform OT initial assessment.

## 2020-09-03 PROCEDURE — 99232 SBSQ HOSP IP/OBS MODERATE 35: CPT | Mod: GC | Performed by: PSYCHIATRY & NEUROLOGY

## 2020-09-03 PROCEDURE — H2032 ACTIVITY THERAPY, PER 15 MIN: HCPCS

## 2020-09-03 PROCEDURE — 12400001 ZZH R&B MH UMMC

## 2020-09-03 PROCEDURE — 25000132 ZZH RX MED GY IP 250 OP 250 PS 637: Performed by: STUDENT IN AN ORGANIZED HEALTH CARE EDUCATION/TRAINING PROGRAM

## 2020-09-03 PROCEDURE — 99238 HOSP IP/OBS DSCHRG MGMT 30/<: CPT | Mod: GC | Performed by: PSYCHIATRY & NEUROLOGY

## 2020-09-03 PROCEDURE — G0177 OPPS/PHP; TRAIN & EDUC SERV: HCPCS

## 2020-09-03 RX ORDER — FOLIC ACID 1 MG/1
1 TABLET ORAL DAILY
Qty: 30 TABLET | Refills: 0 | Status: SHIPPED | OUTPATIENT
Start: 2020-09-04

## 2020-09-03 RX ORDER — LANOLIN ALCOHOL/MO/W.PET/CERES
100 CREAM (GRAM) TOPICAL DAILY
Qty: 30 TABLET | Refills: 0 | Status: SHIPPED | OUTPATIENT
Start: 2020-09-04

## 2020-09-03 RX ORDER — ESCITALOPRAM OXALATE 10 MG/1
10 TABLET ORAL DAILY
Qty: 30 TABLET | Refills: 0 | Status: SHIPPED | OUTPATIENT
Start: 2020-09-04

## 2020-09-03 RX ADMIN — FOLIC ACID 1 MG: 1 TABLET ORAL at 09:27

## 2020-09-03 RX ADMIN — THIAMINE HCL TAB 100 MG 100 MG: 100 TAB at 09:27

## 2020-09-03 RX ADMIN — ACETAMINOPHEN 650 MG: 325 TABLET, FILM COATED ORAL at 19:04

## 2020-09-03 RX ADMIN — MULTIPLE VITAMINS W/ MINERALS TAB 1 TABLET: TAB at 09:27

## 2020-09-03 RX ADMIN — ESCITALOPRAM 10 MG: 5 TABLET, FILM COATED ORAL at 09:27

## 2020-09-03 ASSESSMENT — ACTIVITIES OF DAILY LIVING (ADL)
LAUNDRY: WITH SUPERVISION
DRESS: SCRUBS (BEHAVIORAL HEALTH);INDEPENDENT
ORAL_HYGIENE: INDEPENDENT
HYGIENE/GROOMING: HANDWASHING;SHOWER;INDEPENDENT

## 2020-09-03 ASSESSMENT — MIFFLIN-ST. JEOR: SCORE: 1397.67

## 2020-09-03 NOTE — PLAN OF CARE
"  Problem: Adult Inpatient Plan of Care  Goal: Plan of Care Review  Outcome: No Change     48 Hour Nursing Observation    8/31/2020   Suicidal ideation [R45.851]  Alcoholic intoxication without complication (H) [F10.920]     /60 (BP Location: Left arm)   Pulse 60   Temp 98.6  F (37  C) (Oral)   Resp 16   Ht 1.651 m (5' 5\")   Wt 66.3 kg (146 lb 3.2 oz)   LMP 07/27/2020   SpO2 95%   Breastfeeding No   BMI 24.33 kg/m       Pt complained of a little head ache this evening. Paged the on-call psychiatrist and obtained orders. PRN Tylenol was given to pt. Her temp at early part of shift was 99.0. Re-checked temp before giving the Tylenol and it was 98.6. Pt was resting in bed comfortably for the rest of the evening.  "

## 2020-09-03 NOTE — PROGRESS NOTES
Pt observed in the lounge watching TV with others. She reports that she is doing well and the medication changed her body system per having quick menstruation. She wants to get back to work and attend AA meeting. She denies suicidal ideation, hallucinations or SIB. The medications is helping and she wants to start ADHD medication which can help her focus or help her to start reading.     09/02/20 2250   Behavioral Health   Hallucinations denies / not responding to hallucinations   Thinking intact   Orientation time: oriented;date: oriented;place: oriented;person: oriented   Memory baseline memory   Insight admits / accepts   Eye Contact at examiner   Affect full range affect   Mood mood is calm   Physical Appearance/Attire appears stated age   Hygiene well groomed   Suicidality other (see comments)  (Denies)   1. Wish to be Dead (Recent) No   2. Non-Specific Active Suicidal Thoughts (Recent) No   Self Injury other (see comment)  (Denies)   Activity other (see comment)  (Denies)   Speech coherent;clear   Psycho Education   Type of Intervention 1:1 intervention   Response participates, initiates socially appropriate   Hours 0.5   Activities of Daily Living   Oral Hygiene independent   Dress scrubs (behavioral health)   Laundry with supervision   Room Organization independent   Activity   Activity Assistance Provided independent

## 2020-09-03 NOTE — PROGRESS NOTES
Daily CTC Note:     Work Completed:   The patient's care was discussed with the treatment team and chart notes were reviewed.   Patient met with team. She reports mood is starting to improve. Denies any thoughts of self harm in past 24 hours.  Patient has been working on discharge plans.  She is still planning to stay with her mother and attend an outpatient CD treatment program.    Outpatient f/u care is now scheduled.     Discharge plan or goal:   Discharge to [facility] tomorrow  Outpatient psychiatry/therapy now in place.  Outpatient CD treatment.     Barriers to discharge:   Medication mgmt per MD

## 2020-09-03 NOTE — DISCHARGE SUMMARY
"    Psychiatric Discharge Summary    Hospital Day #3    Isha Bloom MRN# [id number]   Age: 30 year old YOB: 1990     Date of Admission:  8/31/2020  Date of Discharge:  9/4/2020  Admitting Physician:  Jeffrey Thayer MD  Discharge Physician:  Jeffrey Thayer MD         Event Leading to Hospitalization:   Isha Bloom is a 30 year old single  female with no previous psychiatric history who presented on 8/31/2020 with suicidal ideation in the context of psychosocial stressors and polysubstance use. Isha states her mood at \"in the middle, not sad, not happy\" and that she last felt well towards the end of February. She was employed, but due to COVID she lost her job. She had difficulty in finding subsequent employment and this is when she started to struggle with depression and anxiety. This caused her to self medicate with alcohol. She had another job in June, but it fell through and her worries about rent/bills worsened.      For the last two months she was living with friends who partied frequently and Isha increased alcohol use and cocaine use, with infrequent ecstasy and madelin use. She had also been recently \"hooking up\" with a man who recorded their sexual encounter without Isha's knowledge. He began sharing the video with mutual friends and she learned of this approximately a week ago which caused her to have a panic attack. She states this had driven her to use alcohol heavily the last 2 days. The night before admission, Isha went to friends and started drinking and got really emotional. She started having strong thoughts of not wanting to live and contemplated drinking herself to death. She called her father who ended up bringing her to the ED.             See Admission note by Jeffrey Thayer MD on 8/312020 for additional details.          Diagnoses:   Diagnostic Impression:  Isha Bloom is a 30 year old female with no previous psychiatric history. " Patient presented to the ER 8/31/2020 with suicidal ideation in the context of psychosocial stressors and polysubstance use. Isha reported depressed mood, sleep disturbances, loss of interest, and trouble concentrating along with suicidal ideation and panic attacks, which she attributes to financial and social stressors; thus far she has attempted to cope with these symptoms by drinking alcohol and using other substances including cocaine. Biologic factors contributing to her presentation include family history of addictive behavior as well as ongoing substance use. Psychosocial factors contributing to this presentation include recent job loss and ongoing pandemic/lockdown. Her presentation is consistent with major depressive disorder with concomitant polysubstance use disorder    Diagnoses:  #Major Depressive Disorder , recurrent, severe  #Polysubstance use disorder (alcohol and cocaine)       Consults:     None         Hospital Course:   Psychiatric Course:  Isha Bloom was admitted to [facility] with attending Jeffrey Thayer MD as a voluntary patient. At admission, patient presented with depressive symptoms, suicidal ideations, and an alcohol breath test of 0.291. She was placed on MSSA protocol to monitor for alcohol withdrawal symptoms, she consistently scored low so it was discontinued after 48 hours. Patient was also started on 20 mg Escitalopram which was reduced to 10 mg due to side effects of headache and nausea experienced by patient.  Reduced dose of 10 mg was well tolerated by patient. Over the course of admission, patient's symptoms improved and she reported that's she no longer had suicidal ideations. MICD treatment was discussed with patient and she expressed that she needed time to think about it. At discharge, patient is stable and excited about leaving the hospital.    Isha Bloom was discharged to home. At the time of discharge Isha Bloom was determined to not be a danger to  "herself or others.    Medical Course:  Patient was medically cleared for admission to [facility]. No medical diagnosis was managed during this admission. She was placed on [facility] protocol for alcohol withdrawal which was discontinued after patient consistently scored low with no withdrawal symptoms.    Risk Assessment:  Isha Bloom has notable risk factors for self-harm, including single status and substance abuse. However, risk is mitigated by absence of past attempts and Family support. Additional steps taken to minimize risk include: close mental health follow up after discharge. Therefore, based on all available evidence including the factors cited above, Isha Bloom does not appear to be at imminent risk for self-harm, and is appropriate for outpatient level of care.     This document serves as a transfer of care to Isha Bloom's outpatient providers.         Discharge Medications:     Current Discharge Medication List      START taking these medications    Details   escitalopram (LEXAPRO) 10 MG tablet Take 1 tablet (10 mg) by mouth daily  Qty: 30 tablet, Refills: 0    Associated Diagnoses: Suicidal ideation; Mild mood disorder (H)      folic acid (FOLVITE) 1 MG tablet Take 1 tablet (1 mg) by mouth daily  Qty: 30 tablet, Refills: 0    Associated Diagnoses: Alcohol abuse with uncomplicated intoxication (H)      thiamine (B-1) 100 MG tablet Take 1 tablet (100 mg) by mouth daily  Qty: 30 tablet, Refills: 0    Associated Diagnoses: Alcohol abuse with uncomplicated intoxication (H)                    Psychiatric Examination:   Appearance:  awake, alert, adequately groomed and appeared older than stated age  Attitude:  cooperative  Eye Contact:  good  Mood:  \"optimisitc\"  Affect:  appropriate and in normal range and reactive  Speech:  clear, coherent  Psychomotor Behavior:  no evidence of tardive dyskinesia, dystonia, or tics  Thought Process:  logical, linear and goal oriented  Associations:  no loose " associations  Thought Content:  no evidence of suicidal ideation or homicidal ideation  Insight:  fair  Judgment:  fair  Oriented to:  time, person, and place  Attention Span and Concentration:  not formally assessed, though adequate for conversation  Recent and Remote Memory:  not formally assessed, though adequate for conversation  Language: fluent English with normal syntax  Fund of Knowledge: appropriate  Muscle Strength and Tone: normal  Gait and Station: Normal         Discharge Plan:   Psychiatric Appointments:   Romelia Mann CNP: 9/9/20 at 10:00am (1 hour appt)  [facility]: [address] [place] [address]  Phone   [phone]   Fax  [phone]     Psychotherapy Appointment:   Dot Laura: 9/18/20 at 1:00pm  [facility]: [address] [place] [address]  Phone [phone]   Fax  [phone]     Please contact your [place] [facility]  for funding and placement.   Appointment: [facility] : [address] [place] [address] [phone]      Pt seen and discussed with my attending, MD Isa Kim MD  PGY-1 Psychiatry Resident         Attestation:  I, Jeffrey Thayer, saw and evaluated the patient with the resident physician.  I agree with the findings and plan of care as documented in the resident note.  I have reviewed all labs and vital signs.  I, Jeffrey Thayer, have reviewed this summary and agree with the findings and discharge plan as written.                Appendix A: All Labs This Admission:     Results for orders placed or performed during the hospital encounter of 08/31/20   Drug abuse screen 6 urine (tox)     Status: Abnormal   Result Value Ref Range    Amphetamine Qual Urine Negative NEG^Negative    Barbiturates Qual Urine Negative NEG^Negative    Benzodiazepine Qual Urine Negative NEG^Negative    Cannabinoids Qual Urine Negative NEG^Negative    Cocaine Qual Urine Positive (A)  NEG^Negative    Ethanol Qual Urine Positive (A) NEG^Negative    Opiates Qualitative Urine Negative NEG^Negative   HCG qualitative urine     Status: None   Result Value Ref Range    HCG Qual Urine Negative NEG^Negative   Asymptomatic COVID-19 Virus (Coronavirus) by PCR     Status: None    Specimen: Nasopharyngeal   Result Value Ref Range    COVID-19 Virus PCR to [facility] - Source Nasopharyngeal     COVID-19 Virus PCR to [facility] - Result       Test received-See reflex to IDDL test SARS CoV2 (COVID-19) Virus RT-PCR   SARS-CoV-2 COVID-19 Virus (Coronavirus) RT-PCR Nasopharyngeal     Status: None    Specimen: Nasopharyngeal   Result Value Ref Range    SARS-CoV-2 Virus Specimen Source Nasopharyngeal     SARS-CoV-2 PCR Result NEGATIVE     SARS-CoV-2 PCR Comment       Testing was performed using the GENERAL MEDICAL MERATEert Xpress SARS-CoV-2 Assay on the Cepheid Gene-Xpert   Instrument Systems. Additional information about this Emergency Use Authorization (EUA)   assay can be found via the Lab Guide.     CBC with platelets     Status: Abnormal   Result Value Ref Range    WBC 2.7 (L) 4.0 - 11.0 10e9/L    RBC Count 4.00 3.8 - 5.2 10e12/L    Hemoglobin 12.8 11.7 - 15.7 g/dL    Hematocrit 38.3 35.0 - 47.0 %    MCV 96 78 - 100 fl    MCH 32.0 26.5 - 33.0 pg    MCHC 33.4 31.5 - 36.5 g/dL    RDW 12.7 10.0 - 15.0 %    Platelet Count 244 150 - 450 10e9/L   TSH with free T4 reflex and/or T3 as indicated     Status: None   Result Value Ref Range    TSH 2.42 0.40 - 4.00 mU/L   Lipid panel     Status: Abnormal   Result Value Ref Range    Cholesterol 210 (H) <200 mg/dL    Triglycerides 147 <150 mg/dL    HDL Cholesterol 65 >49 mg/dL    LDL Cholesterol Calculated 116 (H) <100 mg/dL    Non HDL Cholesterol 145 (H) <130 mg/dL   Vitamin B12     Status: None   Result Value Ref Range    Vitamin B12 421 193 - 986 pg/mL   Vitamin D Deficiency     Status: None   Result Value Ref Range    Vitamin D Deficiency screening 20 20 - 75 ug/L   Comprehensive metabolic panel      Status: None   Result Value Ref Range    Sodium 140 133 - 144 mmol/L    Potassium 3.7 3.4 - 5.3 mmol/L    Chloride 103 94 - 109 mmol/L    Carbon Dioxide 30 20 - 32 mmol/L    Anion Gap 7 3 - 14 mmol/L    Glucose 81 70 - 99 mg/dL    Urea Nitrogen 12 7 - 30 mg/dL    Creatinine 0.92 0.52 - 1.04 mg/dL    GFR Estimate 84 >60 mL/min/[1.73_m2]    GFR Estimate If Black >90 >60 mL/min/[1.73_m2]    Calcium 8.8 8.5 - 10.1 mg/dL    Bilirubin Total 0.7 0.2 - 1.3 mg/dL    Albumin 3.4 3.4 - 5.0 g/dL    Protein Total 7.1 6.8 - 8.8 g/dL    Alkaline Phosphatase 58 40 - 150 U/L    ALT 19 0 - 50 U/L    AST 23 0 - 45 U/L   Alcohol breath test POCT     Status: Abnormal   Result Value Ref Range    Alcohol Breath Test 0.291 (A) 0.00 - 0.01

## 2020-09-03 NOTE — PROGRESS NOTES
09/03/20 1400   Occupational Therapy   Type of Intervention structured groups   Response Initiates, socially acceptable   Hours 1       Pt attended mental health management group focused on resilience and identifying emotions. Education was provided on strategies to increase resilience and application of concept to personal life. Pt Response: Reported feeling anxious when thinking about discharge and living with her mother, however remains optimistic. Attentive to video and group discussion.     Initial OT assessment held d/t expected discharge tomorrow.

## 2020-09-03 NOTE — PROGRESS NOTES
Cross Cover Note    Subjective  Notified by BRIDGET Peñaloza that Isha was requesting acetaminophen for a headache.    Objective  9/1/2020 AST and ALT WNL    Assessment & Plan  30-year-old woman with headache and no contraindications to treatment with acetaminophen.  - Acetaminophenn 650 mg Q4H PRN     Leah Rios MD  Psychiatry PGY-2

## 2020-09-03 NOTE — PROGRESS NOTES
Pt was isolated in her room in the beginning of the shift. Toward the end of the shift pt started to attend groups. Pt reported feeling good and reported she is ready for discharge. Pt denies SI and SIB.          09/03/20 1317   Behavioral Health   Hallucinations denies / not responding to hallucinations   Thinking intact   Orientation person: oriented;place: oriented;date: oriented;time: oriented   Memory baseline memory   Insight admits / accepts   Judgement intact   Eye Contact at examiner   Affect blunted, flat   Mood mood is calm   Physical Appearance/Attire appears stated age;attire appropriate to age and situation   Hygiene well groomed   Suicidality other (see comments)  (Denies)   1. Wish to be Dead (Recent) No   2. Non-Specific Active Suicidal Thoughts (Recent) No   3. Active Sucidal Ideation with any Methods (Not Plan) Without Intent to Act (Recent) No   4. Active Suicidal Ideation with Some Intent to Act, Without Specific Plan (Recent) No   5. Active Suicidal Ideation with Specific Plan and Intent (Recent) No   Self Injury other (see comment)  (None Observed)   Activity withdrawn   Speech coherent;clear   Medication Sensitivity no stated side effects;no observed side effects   Psychomotor / Gait balanced;steady   Coping/Psychosocial   Verbalized Emotional State acceptance   Activities of Daily Living   Hygiene/Grooming handwashing;shower;independent   Oral Hygiene independent   Dress scrubs (behavioral health);independent   Laundry with supervision   Room Organization independent

## 2020-09-04 VITALS
OXYGEN SATURATION: 99 % | SYSTOLIC BLOOD PRESSURE: 139 MMHG | RESPIRATION RATE: 16 BRPM | HEART RATE: 75 BPM | HEIGHT: 65 IN | WEIGHT: 147 LBS | DIASTOLIC BLOOD PRESSURE: 77 MMHG | BODY MASS INDEX: 24.49 KG/M2 | TEMPERATURE: 98.8 F

## 2020-09-04 PROCEDURE — 25000132 ZZH RX MED GY IP 250 OP 250 PS 637: Performed by: STUDENT IN AN ORGANIZED HEALTH CARE EDUCATION/TRAINING PROGRAM

## 2020-09-04 RX ADMIN — FOLIC ACID 1 MG: 1 TABLET ORAL at 09:06

## 2020-09-04 RX ADMIN — MULTIPLE VITAMINS W/ MINERALS TAB 1 TABLET: TAB at 09:06

## 2020-09-04 RX ADMIN — ESCITALOPRAM 10 MG: 5 TABLET, FILM COATED ORAL at 09:06

## 2020-09-04 RX ADMIN — THIAMINE HCL TAB 100 MG 100 MG: 100 TAB at 09:06

## 2020-09-04 ASSESSMENT — ACTIVITIES OF DAILY LIVING (ADL)
ORAL_HYGIENE: INDEPENDENT
HYGIENE/GROOMING: INDEPENDENT
DRESS: INDEPENDENT

## 2020-09-04 NOTE — PROGRESS NOTES
"   09/03/20 2100   Music Therapy   Type of Participation Music therapy group   Response Participates independently   Hours 1   Jillian participated in tonight's group on self-expression and coping.  She stated \"running\" is a creative coping skill she likes to use.  She also enjoys hip hop music and a variety of music.  She showed patience in group, as equipment was malfunctioning, and adaptability.  Her affect was calm and pleasant.   "

## 2020-09-04 NOTE — PROVIDER NOTIFICATION
09/04/20 1800   Behavioral Health   Hallucinations denies / not responding to hallucinations   Thinking intact   Orientation person: oriented;place: oriented;date: oriented;time: oriented   Memory baseline memory   Insight admits / accepts;insight appropriate to situation;insight appropriate to events   Judgement intact   Eye Contact at examiner   Affect full range affect   Mood mood is calm   Physical Appearance/Attire attire appropriate to age and situation;appears stated age   Hygiene well groomed   Suicidality other (see comments)  (denies)   1. Wish to be Dead (Recent) No   2. Non-Specific Active Suicidal Thoughts (Recent) No    patient presented with full range affect denied SI/SIB or hallucinations, was optimistic about future goals, stated she will go back to work and implement some of the coping skills she inquired while at the hospital, discharge teaching was adequate needs some reinforcements no other issues reported or observed.

## 2020-09-04 NOTE — PROGRESS NOTES
Alert and orientated x 3. Denies SI/SIB/AH/VH.  Pleasant and cooperative.  Full range affect.  Plans to be discharged later today when parents can pick her up , states around 7 pm.  visible in the milieu.

## 2020-09-04 NOTE — DISCHARGE INSTRUCTIONS
Behavioral Discharge Planning and Instructions    Summary:   You were admitted to [facility] on 8/31/20  with worsening depression and suicidal ideation under the care of Dr. Thayer.  You met with Dr. Thayer and his team daily for ongoing psychiatric assessment and medication management.  You had opportunities to participate in therapeutic groups on the unit.   At this time you report your mood is stabilizing and you report you are not having thoughts or intent to harm yourself or others. You will be discharged home and will resume care with your outpatient providers.    Disposition:  Home with Mother      Main Diagnosis:   Major Depressive Disorder  Alcohol Use Disorder  Polysubstance Use Disorder      Major Treatments, Procedures and Findings:   Medications were  managed throughout your stay. An internal medicine consult was completed during your stay. You had the opportunity to participate in treatment programming while on the unit including occupational therapy, mental health support and education and spiritual services.     Symptoms to Report:   Please report if you are experiencing increased aggression and/or confusion, problematic loss of sleep, worsening mood, or thoughts of suicide to your treatment team or notify your primary provider.   IF THE SYMPTOMS YOU ARE EXPERIENCING ARE A MEDICAL EMERGENCY, CALL 911 IMMEDIATELY    Lifestyle Adjustment:   1. Adjust your lifestyle to get enough sleep, relaxation, exercise and good nutrition.  Continue to develop healthy coping skills to decrease stress and promote a healthy and sober lifestyle.  2. Abstain from all substances of abuse.  3. Take medications as prescribed.  Please work with your doctor to discuss any concerns you have with your medications or side effects you may be experiencing.  4. Follow up with appointments as scheduled.        Psychiatry Follow-up:    Appointment: Psychiatry: Romelia Mann CNP: 9/9/20 at 10:00am (1 hour appt)  [facility]: [address] [place] [address] [address] [place] [address]  Phone   [phone]   Fax  [phone]    Appointment: Therapy: Dot Laura: 9/18/20 at 1:00pm  [facility]: [facility] [address] [place] [address]  Phone [phone]   Fax  [phone]      Please contact your [place] [facility]  for funding and placement.      Appointment: [facility] : [address] [place] [address]  [phone]        Resources:   *[facility] Crisis: COPE: (889.600.5130) 24 hour mobile crisis support for people having a mental health crisis in [facility].   *Acute Psychiatric Services (558-731-9532). 24-hour walk-in crisis psychiatric support at [facility]; Emergency Medications Clinic available 7:30am - 2:00pm  *Kxyk[employer]st[employer]: Text  \"life\"  to 13638   A trained crisis counselor will respond immediately  *Crisis Connection: ([phone])  24-hour confidential telephone counseling   *[facility] Emergency Room: [phone]      General Medication Instructions:   See your medication sheet(s) for instructions.   Take all medicines as directed.  Make no changes unless your doctor suggests them.   Go to all your doctor visits.  Be sure to have all your required lab tests. This way, your medicines can be refilled on time.  Do not use any drugs not prescribed by your doctor.    The treatment team has appreciated the opportunity to work with you.  We wish you the best in the future.    If you have any questions or concerns our unit number is [phone].     "

## 2020-09-04 NOTE — PROGRESS NOTES
Patient is being discharged today per Dr. Thayer. Patient reports mood is stable, and she feels safe leaving the hospital. Patient has made arrangements to stay with her Mom. Patient reports feeling better that she has outpatient resources/support in place.She has Psychiatry and therapy appts scheduled and will contact the [facility]  who completed her assessment most recently (patient has number at home)  Patient's family will pick her up this afternoon.

## 2020-09-04 NOTE — PROGRESS NOTES
Patient was visible in the milieu and socializing with peers throughout most of this evening shift. She was pleasant in her interactions with staff and peers and attended music therapy group. She required one PRN medication during the shift for uterine cramps around 1900.

## 2020-10-25 ENCOUNTER — HOSPITAL ENCOUNTER (EMERGENCY)
Facility: CLINIC | Age: 30
Discharge: HOME | End: 2020-10-25
Attending: EMERGENCY MEDICINE | Admitting: EMERGENCY MEDICINE
Payer: MEDICAID

## 2020-10-25 ENCOUNTER — APPOINTMENT (OUTPATIENT)
Dept: GENERAL RADIOLOGY | Facility: CLINIC | Age: 30
End: 2020-10-25
Attending: EMERGENCY MEDICINE
Payer: MEDICAID

## 2020-10-25 VITALS
RESPIRATION RATE: 16 BRPM | TEMPERATURE: 99.1 F | OXYGEN SATURATION: 98 % | HEART RATE: 100 BPM | DIASTOLIC BLOOD PRESSURE: 81 MMHG | BODY MASS INDEX: 24.46 KG/M2 | WEIGHT: 147 LBS | SYSTOLIC BLOOD PRESSURE: 145 MMHG
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
## 2020-10-25 LAB
ALCOHOL BREATH TEST: 0.22 (ref 0–0.01)
AMPHETAMINES UR QL SCN: NEGATIVE
BARBITURATES UR QL: NEGATIVE
BENZODIAZ UR QL: NEGATIVE
CANNABINOIDS UR QL SCN: NEGATIVE
COCAINE UR QL: POSITIVE
ETHANOL UR QL SCN: POSITIVE
HCG UR QL: NEGATIVE
OPIATES UR QL SCN: NEGATIVE

## 2020-10-25 PROCEDURE — 99283 EMERGENCY DEPT VISIT LOW MDM: CPT | Performed by: EMERGENCY MEDICINE

## 2020-10-25 PROCEDURE — 82075 ASSAY OF BREATH ETHANOL: CPT | Performed by: EMERGENCY MEDICINE

## 2020-10-25 PROCEDURE — 80320 DRUG SCREEN QUANTALCOHOLS: CPT | Performed by: EMERGENCY MEDICINE

## 2020-10-25 PROCEDURE — 73130 X-RAY EXAM OF HAND: CPT | Mod: RT

## 2020-10-25 PROCEDURE — 81025 URINE PREGNANCY TEST: CPT | Performed by: EMERGENCY MEDICINE

## 2020-10-25 PROCEDURE — 80307 DRUG TEST PRSMV CHEM ANLYZR: CPT | Performed by: EMERGENCY MEDICINE

## 2020-10-25 ASSESSMENT — ENCOUNTER SYMPTOMS
HEADACHES: 0
ABDOMINAL DISTENTION: 0
FATIGUE: 0
NAUSEA: 0
DIARRHEA: 0
BACK PAIN: 0
COUGH: 0
SHORTNESS OF BREATH: 0
CHILLS: 0
ABDOMINAL PAIN: 0
FREQUENCY: 0
DIZZINESS: 0
FEVER: 0
EYE PAIN: 0
VOMITING: 0
PALPITATIONS: 0
CONSTIPATION: 0
COLOR CHANGE: 1
DIFFICULTY URINATING: 0
NECK PAIN: 0
WEAKNESS: 0
SORE THROAT: 0
DYSURIA: 0
MYALGIAS: 0
CONFUSION: 0
CHEST TIGHTNESS: 0
ARTHRALGIAS: 0

## 2020-10-25 NOTE — ED NOTES
Bed: ED16C  Expected date: 10/25/20  Expected time: 12:44 AM  Means of arrival: Ambulance  Comments:  H451  30F  Etoh on a hold

## 2020-10-25 NOTE — DISCHARGE INSTRUCTIONS
Return to the ED if you are having worsening thoughts/feelings of harming yourself or others, or any urgent/life-threatening concerns.     DISCHARGE RESOURCES:  -[facility] [phone]   -[facility] [phone] or [phone].  -Crisis Intervention: [phone] or [phone] (TTY: 323.140.1585).  Call anytime.  -Suicide Awareness Voices of Education (SAVE) (www.save.org): 290-658-UVSZ (8983)  -National Suicide Prevention Line (www.mentalhealthmn.org): 436-462-VJPP (8992)  -National [place] on Mental Illness (www.[place].toro.org): 640.111.1490 or 043-473-2818.  -Boaz5jkqb: text the word LIFE to 16913 for immediate support and crisis intervention  -Mental Health Consumer/Survivor Network of [place] (www.mhcsn.net): 696.206.8153 or 726-236-5792  -Mental [place] Association [place] (www.mentalhealth.org): 785.179.4337 or 780-013-3955
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
## 2020-10-25 NOTE — ED AVS SNAPSHOT
[facility] Emergency Department  [address]  [facility] [place] [place] [address]  Phone: [phone]  Fax: [phone]                                    Isha Bloom   MRN: [id number]    Department: [facility] Emergency Department   Date of Visit: 10/25/2020           After Visit Summary Signature Page    I have received my discharge instructions, and my questions have been answered. I have discussed any challenges I see with this plan with the nurse or doctor.    ..........................................................................................................................................  Patient/Patient Representative Signature      ..........................................................................................................................................  Patient Representative Print Name and Relationship to Patient    ..................................................               ................................................  Date                                   Time    ..........................................................................................................................................  Reviewed by Signature/Title    ...................................................              ..............................................  Date                                               Time          22EPIC Rev 08/18

## 2023-11-03 ENCOUNTER — HOSPITAL ENCOUNTER (EMERGENCY)
Facility: CLINIC | Age: 33
Discharge: HOME | End: 2023-11-03
Attending: EMERGENCY MEDICINE | Admitting: EMERGENCY MEDICINE

## 2023-11-03 ENCOUNTER — APPOINTMENT (OUTPATIENT)
Dept: CT IMAGING | Facility: CLINIC | Age: 33
End: 2023-11-03
Attending: EMERGENCY MEDICINE

## 2023-11-03 VITALS
SYSTOLIC BLOOD PRESSURE: 118 MMHG | TEMPERATURE: 98.1 F | DIASTOLIC BLOOD PRESSURE: 77 MMHG | OXYGEN SATURATION: 99 % | BODY MASS INDEX: 25.79 KG/M2 | HEART RATE: 86 BPM | RESPIRATION RATE: 11 BRPM | WEIGHT: 155 LBS

## 2023-11-03 DIAGNOSIS — S09.90XA CLOSED HEAD INJURY, INITIAL ENCOUNTER: ICD-10-CM

## 2023-11-03 DIAGNOSIS — R55 SYNCOPE, UNSPECIFIED SYNCOPE TYPE: ICD-10-CM

## 2023-11-03 DIAGNOSIS — S16.1XXA STRAIN OF NECK MUSCLE, INITIAL ENCOUNTER: ICD-10-CM

## 2023-11-03 LAB
ANION GAP SERPL CALCULATED.3IONS-SCNC: 9 MMOL/L (ref 7–15)
ATRIAL RATE - MUSE: 56 BPM
ATRIAL RATE - MUSE: 58 BPM
BASOPHILS # BLD AUTO: 0 10E3/UL (ref 0–0.2)
BASOPHILS NFR BLD AUTO: 0 %
BUN SERPL-MCNC: 12.6 MG/DL (ref 6–20)
CALCIUM SERPL-MCNC: 8.1 MG/DL (ref 8.6–10)
CHLORIDE SERPL-SCNC: 104 MMOL/L (ref 98–107)
CREAT SERPL-MCNC: 0.89 MG/DL (ref 0.51–0.95)
DEPRECATED HCO3 PLAS-SCNC: 24 MMOL/L (ref 22–29)
DIASTOLIC BLOOD PRESSURE - MUSE: NORMAL MMHG
DIASTOLIC BLOOD PRESSURE - MUSE: NORMAL MMHG
EGFRCR SERPLBLD CKD-EPI 2021: 88 ML/MIN/1.73M2
EOSINOPHIL # BLD AUTO: 0 10E3/UL (ref 0–0.7)
EOSINOPHIL NFR BLD AUTO: 0 %
ERYTHROCYTE [DISTWIDTH] IN BLOOD BY AUTOMATED COUNT: 11.9 % (ref 10–15)
GLUCOSE BLDC GLUCOMTR-MCNC: 60 MG/DL (ref 70–99)
GLUCOSE SERPL-MCNC: 101 MG/DL (ref 70–99)
HCG SERPL QL: NEGATIVE
HCT VFR BLD AUTO: 37.2 % (ref 35–47)
HGB BLD-MCNC: 12.2 G/DL (ref 11.7–15.7)
IMM GRANULOCYTES # BLD: 0 10E3/UL
IMM GRANULOCYTES NFR BLD: 0 %
INTERPRETATION ECG - MUSE: NORMAL
INTERPRETATION ECG - MUSE: NORMAL
LYMPHOCYTES # BLD AUTO: 1.1 10E3/UL (ref 0.8–5.3)
LYMPHOCYTES NFR BLD AUTO: 11 %
MCH RBC QN AUTO: 31 PG (ref 26.5–33)
MCHC RBC AUTO-ENTMCNC: 32.8 G/DL (ref 31.5–36.5)
MCV RBC AUTO: 94 FL (ref 78–100)
MONOCYTES # BLD AUTO: 0.7 10E3/UL (ref 0–1.3)
MONOCYTES NFR BLD AUTO: 7 %
NEUTROPHILS # BLD AUTO: 7.9 10E3/UL (ref 1.6–8.3)
NEUTROPHILS NFR BLD AUTO: 82 %
NRBC # BLD AUTO: 0 10E3/UL
NRBC BLD AUTO-RTO: 0 /100
P AXIS - MUSE: 54 DEGREES
P AXIS - MUSE: 57 DEGREES
PLATELET # BLD AUTO: 264 10E3/UL (ref 150–450)
POTASSIUM SERPL-SCNC: 3.9 MMOL/L (ref 3.4–5.3)
PR INTERVAL - MUSE: 134 MS
PR INTERVAL - MUSE: 154 MS
QRS DURATION - MUSE: 80 MS
QRS DURATION - MUSE: 82 MS
QT - MUSE: 434 MS
QT - MUSE: 444 MS
QTC - MUSE: 418 MS
QTC - MUSE: 435 MS
R AXIS - MUSE: 45 DEGREES
R AXIS - MUSE: 46 DEGREES
RBC # BLD AUTO: 3.94 10E6/UL (ref 3.8–5.2)
SODIUM SERPL-SCNC: 137 MMOL/L (ref 135–145)
SYSTOLIC BLOOD PRESSURE - MUSE: NORMAL MMHG
SYSTOLIC BLOOD PRESSURE - MUSE: NORMAL MMHG
T AXIS - MUSE: 27 DEGREES
T AXIS - MUSE: 28 DEGREES
TROPONIN T SERPL HS-MCNC: <6 NG/L
VENTRICULAR RATE- MUSE: 56 BPM
VENTRICULAR RATE- MUSE: 58 BPM
WBC # BLD AUTO: 9.7 10E3/UL (ref 4–11)

## 2023-11-03 PROCEDURE — 85004 AUTOMATED DIFF WBC COUNT: CPT | Performed by: EMERGENCY MEDICINE

## 2023-11-03 PROCEDURE — 84703 CHORIONIC GONADOTROPIN ASSAY: CPT | Performed by: EMERGENCY MEDICINE

## 2023-11-03 PROCEDURE — 12002 RPR S/N/AX/GEN/TRNK2.6-7.5CM: CPT

## 2023-11-03 PROCEDURE — 82962 GLUCOSE BLOOD TEST: CPT

## 2023-11-03 PROCEDURE — 84484 ASSAY OF TROPONIN QUANT: CPT | Performed by: EMERGENCY MEDICINE

## 2023-11-03 PROCEDURE — 93005 ELECTROCARDIOGRAM TRACING: CPT

## 2023-11-03 PROCEDURE — 96360 HYDRATION IV INFUSION INIT: CPT

## 2023-11-03 PROCEDURE — 72125 CT NECK SPINE W/O DYE: CPT

## 2023-11-03 PROCEDURE — 80048 BASIC METABOLIC PNL TOTAL CA: CPT | Performed by: EMERGENCY MEDICINE

## 2023-11-03 PROCEDURE — 99285 EMERGENCY DEPT VISIT HI MDM: CPT | Mod: 25

## 2023-11-03 PROCEDURE — 258N000003 HC RX IP 258 OP 636: Performed by: EMERGENCY MEDICINE

## 2023-11-03 PROCEDURE — 70450 CT HEAD/BRAIN W/O DYE: CPT

## 2023-11-03 PROCEDURE — 36415 COLL VENOUS BLD VENIPUNCTURE: CPT | Performed by: EMERGENCY MEDICINE

## 2023-11-03 RX ORDER — ONDANSETRON 2 MG/ML
4 INJECTION INTRAMUSCULAR; INTRAVENOUS EVERY 30 MIN PRN
Status: DISCONTINUED | OUTPATIENT
Start: 2023-11-03 | End: 2023-11-03 | Stop reason: HOSPADM

## 2023-11-03 RX ORDER — ACETAMINOPHEN 500 MG
1000 TABLET ORAL
Status: DISCONTINUED | OUTPATIENT
Start: 2023-11-03 | End: 2023-11-03 | Stop reason: HOSPADM

## 2023-11-03 RX ADMIN — SODIUM CHLORIDE 1000 ML: 9 INJECTION, SOLUTION INTRAVENOUS at 16:52

## 2023-11-03 ASSESSMENT — ACTIVITIES OF DAILY LIVING (ADL)
ADLS_ACUITY_SCORE: 35
ADLS_ACUITY_SCORE: 35
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
## 2023-11-03 NOTE — Clinical Note
Isha Bloom was seen and treated in our emergency department on 11/3/2023.  She may return to work on 11/13/2023.  Ms Bloom may require time off from work for concussion related symptoms. She may return to work sooner if she is feeling improved. If she requires longer time off than allowed in this letter, she should see her doctor to discuss further.     If you have any questions or concerns, please don't hesitate to call.      Rahul Marroquin MD

## 2023-11-03 NOTE — DISCHARGE INSTRUCTIONS
Discharge Instructions  Head Injury    You have been seen today for a head injury. Your evaluation included a history and physical examination. You may have had a CT (CAT) scan performed, though most head injuries do not require a scan. Based on this evaluation, your provider today does not feel that your head injury is serious.    Generally, every Emergency Department visit should have a follow-up clinic visit with either a primary or a specialty clinic/provider. Please follow-up as instructed by your emergency provider today.  Return to the Emergency Department if:  You are confused or you are not acting right.  Your headache gets worse or you start to have a really bad headache even with your recommended treatment plan.  You vomit (throw up) more than once.  You have a seizure.  You have trouble walking.  You have weakness or paralysis (cannot move) in an arm or a leg.  You have blood or fluid coming from your ears or nose.  You have new symptoms or anything that worries you.    Sleeping:  It is okay for you to sleep, but someone should wake you up if instructed by your provider, and someone should check on you at your usual time to wake up.     Activity:  Do not drive for at least 24 hours.  Do not drive if you have dizzy spells or trouble concentrating, or remembering things.  Do not return to any contact sports until cleared by your regular provider.     MORE INFORMATION:    Concussion:  A concussion is a minor head injury that may cause temporary problems with the way the brain works. Although concussions are important, they are generally not an emergency or a reason that a person needs to be hospitalized. Some concussion symptoms include confusion, amnesia (forgetful), nausea (sick to your stomach) and vomiting (throwing up), dizziness, fatigue, memory or concentration problems, irritability and sleep problems. For most people, concussions are mild and temporary but some will have more severe and persistent  symptoms that require on-going care and treatment.  CT Scans: Your evaluation today may have included a CT scan (CAT scan) to look for things like bleeding or a skull fracture (broken bone).  CT scans involve radiation and too many CT scans can cause serious health problems like cancer, especially in children.  Because of this, your provider may not have ordered a CT scan today if they think you are at low risk for a serious or life threatening problem.    If you were given a prescription for medicine here today, be sure to read all of the information (including the package insert) that comes with your prescription.  This will include important information about the medicine, its side effects, and any warnings that you need to know about.  The pharmacist who fills the prescription can provide more information and answer questions you may have about the medicine.  If you have questions or concerns that the pharmacist cannot address, please call or return to the Emergency Department.     Remember that you can always come back to the Emergency Department if you are not able to see your regular provider in the amount of time listed above, if you get any new symptoms, or if there is anything that worries you.    Discharge Instructions  Neck Strain    You have been seen today for a neck sprain or strain.  Neck strains usually result from an injury to the neck. Car accidents, contact sports, and falls are common causes of neck strain. Sometimes your neck can start to hurt because of increased activity, muscle tension, an abnormal sleeping position, or because of other problems like arthritis in the neck.     Neck pain usually comes from injured muscles and ligaments. Sometimes there is a herniated ( slipped ) disc. We do not usually do MRI scans to look for these right away, since most herniated discs will get better on their own with time. Today, we did not find any evidence that your neck pain was caused by a serious or  dangerous condition. However, sometimes symptoms develop over time and cannot be found during an emergency visit, so it is very important that you follow up with your primary provider.    Generally, every Emergency Department visit should have a follow-up clinic visit with either a primary or a specialty clinic/provider. Please follow-up as instructed by your emergency provider today.    Return to the Emergency Department if:  You have increasing pain in your neck.  You develop difficulty swallowing or breathing.  You have numbness, weakness, or trouble moving your arms or legs.  You have severe dizziness and difficulty walking.  You are unable to control your bladder or bowels.  You develop severe headache or ringing in the ears.    What can I do to help myself at home?  If you had an injury, use cold for the first 1-2 days. Cold helps relieve pain and reduce inflammation.  Apply ice packs to the neck or areas of pain every 1-2 hours for 20 minutes at a time. Place a towel or cloth between your skin and the ice pack.  After the first 2 days, using heat can help with neck pain and stiffness. You may use a warm shower or bath, warm towels on the neck, or a heating pad. Do not sleep with a heating pad, as you can be burned.   Pain medications - You may take a pain medication such as Tylenol  (acetaminophen), Advil  and Motrin  (ibuprofen), or Aleve  (naproxen).  It is usually best to rest the neck for 1-2 days after an injury, then start gentle stretching exercises.   It is helpful to place a small pillow under the nape of your neck to provide proper neutral positioning.   You should stay active and do your usual work as much as you can, unless this involves heavy physical labor. Ask your provider if you need work restrictions.  If you were given a prescription for medicine here today, be sure to read all of the information (including the package insert) that comes with your prescription.  This will include important  information about the medicine, its side effects, and any warnings that you need to know about.  The pharmacist who fills the prescription can provide more information and answer questions you may have about the medicine.  If you have questions or concerns that the pharmacist cannot address, please call or return to the Emergency Department.   Remember that you can always come back to the Emergency Department if you are not able to see your regular provider in the amount of time listed above, if you get any new symptoms, or if there is anything that worries you.    Discharge Instructions  Syncope    Syncope (fainting) is a sudden, short loss of consciousness (passing out spell). People will usually fall to the ground when they faint or slump over if seated.  People may also shake when this happens, and it can sometimes be difficult to tell the difference between syncope and a seizure. At this time, your provider does not find a reason to suspect that your fainting spell is a sign of anything dangerous or life-threatening.  However, sometimes the signs of serious illness do not show up right away.     Generally, every Emergency Department visit should have a follow-up clinic visit with either a primary or a specialty clinic/provider. Please follow-up as instructed by your emergency provider today.    Return to the Emergency Department if:  You faint again.   You have any significant bleeding.  You have chest pain or a fast or irregular heartbeat.  You feel short of breath.  You cough up any blood.  You have abdominal (belly) pain or unusual back pain.  You have ongoing vomiting (throwing up) or diarrhea (loose stools).  You have a black or tarry bowel movement, or blood in the stool or in your vomit.  You have a fever over 101 F.  You lose feeling or cannot move a part of your body or cannot talk normally.  You are confused, have a headache, cannot see well, or have a seizure.  DO NOT DRIVE. CALL 911 INSTEAD!    What  can I do to help myself?  Follow any specific instructions that your provider discussed with you.  If you feel light-headed, make sure to sit down right away, even if you have to sit on the floor.  Follow up with your regular medical provider as discussed for further management. This may include lowering your blood pressure medications, insulin or other diabetic medications, checking your blood sugar more frequently, and drinking more fluids, taking medicines for vomiting or diarrhea or getting up slower.  If you were given a prescription for medicine here today, be sure to read all of the information (including the package insert) that comes with your prescription.  This will include important information about the medicine, its side effects, and any warnings that you need to know about.  The pharmacist who fills the prescription can provide more information and answer questions you may have about the medicine.  If you have questions or concerns that the pharmacist cannot address, please call or return to the Emergency Department.   Remember that you can always come back to the Emergency Department if you are not able to see your regular provider in the amount of time listed above, if you get any new symptoms, or if there is anything that worries you.    Discharge Instructions  Laceration (Cut)    You were seen today for a laceration (cut).  Your provider examined your laceration for any problems such a buried foreign body (like glass, a splinter, or gravel), or injury to blood vessels, tendons, and nerves.  Your provider may have also rinsed and/or scrubbed your laceration to help prevent an infection. It may not be possible to find all problems with your laceration on the first visit; occasionally foreign bodies or a tendon injury can go undetected.    Your laceration may have been closed in one of several ways:  No closure: many wounds will heal just fine without closure.  Stitches: regular stitches that require  removal.  Staples: skin staples are often used in the scalp/head.  Wound adhesive (glue): skin glue can be used for certain lacerations and doesn t require removal.  Wound strips (aka Butterfly bandages or steri-strips): these are bandages that help to close a wound.  Absorbable stitches:  dissolving  stitches that go away on their own and usually don t require removal.    A small percentage of wounds will develop an infection regardless of how well the wound is cared for. Antibiotics are generally not indicated to prevent an infection so are only given for a small number of high-risk wounds. Some lacerations are too high risk to close, and are left open to heal because closure can increase the likelihood that an infection will develop.    Remember that all lacerations, no matter how expertly repaired, will cause scarring. We consider many factors, techniques, and materials, in our efforts to provide the best possible cosmetic outcome.    Generally, every Emergency Department visit should have a follow-up clinic visit with either a primary or a specialty clinic/provider. Please follow-up as instructed by your emergency provider today.     Return to the Emergency Department right away if:  You have more redness, swelling, pain, drainage (pus), a bad smell, or red streaking from your laceration as these symptoms could indicate an infection.  You have a fever of 100.4 F or more.  You have bleeding that you cannot stop at home. If your cut starts to bleed, hold pressure on the bleeding area with a clean cloth or put pressure over the bandage.  If the bleeding does not stop after using constant pressure for 30 minutes, you should return to the Emergency Department for further treatment.  An area past the laceration is cool, pale, or blue compared with the other side, or has a slower return of color when squeezed.  Your dressing seems too tight or starts to get uncomfortable or painful. For children, signs of a problem  might be irritability or restlessness.  You have loss of normal function or use of an area, such as being unable to straighten or bend a finger normally.  You have a numb area past the laceration.    Return to the Emergency Department or see your regular provider if:  The laceration starts to come open.   You have something coming out of the cut or a feeling that there is something in the laceration.  Your wound will not heal, or keeps breaking open. There can always be glass, wood, dirt or other things in any wound.  They will not always show up, even on x-rays.  If a wound does not heal, this may be why, and it is important to follow-up with your regular provider.    Home Care:  Take your dressing off in 12-24 hours, or as instructed by your provider, to check your laceration. Remove the dressing sooner if it seems too tight or painful, or if it is getting numb, tingly, or pale past the dressing.  Gently wash your laceration 1-2 times daily with clean water and mild soap. It is okay to shower or run clean water over the laceration, but do not let the laceration soak in water (no swimming).  If your laceration was closed with wound adhesive or strips: pat it dry and leave it open to the air. For all other repairs: after you wash your laceration, or at least 2 times a day, apply antibiotic ointment (such as Neosporin  or Bacitracin ) to the laceration, then cover it with a Band-Aid  or gauze.  Keep the laceration clean. Wear gloves or other protective clothing if you are around dirt.    Follow-up for removal:  If your wound was closed with staples or regular stitches, they need to be removed according to the instructions and timeline specified by your provider today.  If your wound was closed with absorbable ( dissolving ) sutures, they should fall out, dissolve, or not be visible in about one week. If they are still visible, then they should be removed according to the instructions and timeline specified by your  provider today.    Scars:  To help minimize scarring:  Wear sunscreen over the healed laceration when out in the sun.  Massage the area regularly once healed.  You may apply Vitamin E to the healed wound.  Wait. Scars improve in appearance over months and years.    If you were given a prescription for medicine here today, be sure to read all of the information (including the package insert) that comes with your prescription.  This will include important information about the medicine, its side effects, and any warnings that you need to know about.  The pharmacist who fills the prescription can provide more information and answer questions you may have about the medicine.  If you have questions or concerns that the pharmacist cannot address, please call or return to the Emergency Department.       Remember that you can always come back to the Emergency Department if you are not able to see your regular provider in the amount of time listed above, if you get any new symptoms, or if there is anything that worries you.

## 2023-11-03 NOTE — ED NOTES
Bed: ED24  Expected date:   Expected time:   Means of arrival:   Comments:  [facility] - 33F syncope, head strike, SBP 70s

## 2023-11-03 NOTE — ED PROVIDER NOTES
History     Chief Complaint:  Syncope       HPI   Isha Bloom is a 33 year old female who presents for evaluation of syncope. The patient reports that she felt fine today prior to donating plasma, but she felt suddenly dizzy and dark vision while waiting in the recovery area after donating her plasma. She explains that she was able to sit herself into a chair, but she proceeded to have an episode of emesis while sitting. The patient adds that she walked to the trash can to throw up again when she had her syncopal episode and struck her head on the metal part of a windowsill, sustaining a laceration to the back of her head. Isha also endorses headache and some lateral neck pain from the fall, but her pains are mild. She denies chest pain and shortness of breath. The patient reports that while being placed on the stretcher and placed into a c-collar, she had one more episode of vomiting. Her nausea, vomiting, and dizziness resolved with fluids and Zofran. The patient is not anticoagulated.    Independent Historian:   None - Patient Only    Medications:    Lexapro  Symbicort    Past Medical History:    Asthma  Depression  Anxiety    Physical Exam   Patient Vitals for the past 24 hrs:   BP Temp Temp src Pulse Resp SpO2 Weight   11/03/23 1700 113/61 -- -- 63 17 99 % --   11/03/23 1626 -- -- -- -- 18 -- 70.3 kg (155 lb)   11/03/23 1625 -- 98.1  F (36.7  C) Oral -- -- -- --   11/03/23 1620 -- -- -- -- -- 98 % --   11/03/23 1619 94/63 -- -- 60 -- -- --        Physical Exam  VS: Reviewed per above  HENT: Mucous membranes moist. 5.5cm occipital scalp laceration.  EYES: sclera anicteric  CV: Rate as noted, regular rhythm.   RESP: Effort normal. Breath sounds are normal bilaterally.  GI: no tenderness/rebound/guarding, not distended.  NEURO: Alert, moving all extremities  MSK: No deformity of the extremities.  No midline C/T/L-spine tenderness.  She does have tenderness to the bilateral C-spine region however.  SKIN:  Warm and dry      Emergency Department Course   ECG  ECG taken at 1636, ECG read at 1643  Sinus bradycardia with sinus arrhythmia  Low voltage QRS  Borderline ECG   Rate 56 bpm. CA interval 134 ms. QRS duration 80 ms. QT/QTc 434/418 ms. P-R-T axes 57 45 27.     Imaging:  Cervical spine CT w/o contrast   Final Result   IMPRESSION:   HEAD CT:   1.  No acute intracranial process.      CERVICAL SPINE CT:   1.  No CT evidence for acute fracture or post traumatic subluxation.      Head CT w/o contrast   Final Result   IMPRESSION:   HEAD CT:   1.  No acute intracranial process.      CERVICAL SPINE CT:   1.  No CT evidence for acute fracture or post traumatic subluxation.          Laboratory:  Labs Ordered and Resulted from Time of ED Arrival to Time of ED Departure   BASIC METABOLIC PANEL - Abnormal       Result Value    Sodium 137      Potassium 3.9      Chloride 104      Carbon Dioxide (CO2) 24      Anion Gap 9      Urea Nitrogen 12.6      Creatinine 0.89      GFR Estimate 88      Calcium 8.1 (*)     Glucose 101 (*)    GLUCOSE BY METER - Abnormal    GLUCOSE BY METER POCT 60 (*)    TROPONIN T, HIGH SENSITIVITY - Normal    Troponin T, High Sensitivity <6     HCG QUALITATIVE PREGNANCY - Normal    hCG Serum Qualitative Negative     CBC WITH PLATELETS AND DIFFERENTIAL    WBC Count 9.7      RBC Count 3.94      Hemoglobin 12.2      Hematocrit 37.2      MCV 94      MCH 31.0      MCHC 32.8      RDW 11.9      Platelet Count 264      % Neutrophils 82      % Lymphocytes 11      % Monocytes 7      % Eosinophils 0      % Basophils 0      % Immature Granulocytes 0      NRBCs per 100 WBC 0      Absolute Neutrophils 7.9      Absolute Lymphocytes 1.1      Absolute Monocytes 0.7      Absolute Eosinophils 0.0      Absolute Basophils 0.0      Absolute Immature Granulocytes 0.0      Absolute NRBCs 0.0          Procedures     Laceration Repair      Procedure: Laceration Repair    Indication: Laceration    Consent: Verbal    Location: Posterior  head    Length: 5.5 cm    Preparation: Irrigation with Sterile Saline.    Anesthesia/Sedation: Lidocaine - 1%      Treatment/Exploration: Wound explored, no foreign bodies found     Closure: The wound was closed with  6 staples.    Patient Status: The patient tolerated the procedure well: Yes. There were no complications.    Emergency Department Course & Assessments:      Interventions:  Medications   acetaminophen (TYLENOL) tablet 1,000 mg (has no administration in time range)   ondansetron (ZOFRAN) injection 4 mg (has no administration in time range)   sodium chloride 0.9% BOLUS 1,000 mL (1,000 mLs Intravenous $New Bag 11/3/23 1653)        Assessments:  1636 I obtained history and examined the patient as noted above  1833 I rechecked the patient and removed her c-collar  1845 I rechecked the patient again and performed a laceration repair as noted above        Consultations/Discussion of Management or Tests:  None        Social Determinants of Health affecting care:   None    Disposition:  The patient was discharged to home.     Impression & Plan    CMS Diagnoses: None    Medical Decision Making:  Patient presents to the ER for evaluation of injury sustained after syncopal episode after donating plasma and resulting head injury.  Vital signs reassuring.  CT head and cervical spine negative for acute injury.  Occipital scalp laceration repaired with 6 staples with plan for removal in 10 to 14 days in the outpatient realm.  After IV fluids and antiemetics, patient was feeling improved and ambulated with steady gait.  Suspect syncopal episode was related to recent plasma donation rather than other sinister cardiac pathology.  ECG sinus rhythm here without dysrhythmia detected on telemetry monitoring.  No signs of myocardial ischemia or concern electrolyte derangement or pregnancy detected on evaluation today.  Return precautions discussed prior to discharge.    Diagnosis:    ICD-10-CM    1. Closed head injury,  initial encounter  S09.90XA       2. Strain of neck muscle, initial encounter  S16.1XXA       3. Syncope, unspecified syncope type  R55              Scribe Disclosure:  I, Aroldo Ge, am serving as a scribe at 4:34 PM on 11/3/2023 to document services personally performed by Rahul Marroquin MD based on my observations and the provider's statements to me.   11/3/2023   Rahul Marroquin MD Lindenbaum, Elan, MD  11/03/23 2122

## 2024-06-03 NOTE — PROGRESS NOTES
"    ----------------------------------------------------------------------------------------------------------  [facility], [place]   Psychiatric Progress Note  Hospital Day #3     Interim History:   The patient's care was discussed with the treatment team and chart notes were reviewed.    Sleep 6.75 hours (09/03/20 0700)  Scheduled Medications: took all scheduled medications as prescribed   PRN medications: acetaminophen 650 mg PO PRN for headache     Staff Report:   Chon was pleasant and social according to staff. Engaged in group OT sessions. Reported headache last night and requested acetaminophen. Desires to go to adopted mothers house and proceed with outpatient treatment. Mentioned wanting to start ADHD medication to help with focus.     Patient Interview:   Isha Bloom was interviewed remotely over Zoom per COVID protocols.     Isha is feeling \"optimistic\" today. Feels like there is a plan in place to be successful and seek outpatient treatment. Looking to leave tomorrow (9/4/2020) to her adopted mother's house. Has appointments scheduled with provider and therapist, the first being 9/9/2020. Isha understands that she needs to provide email address for virtual sessions in outpatient.     Going forward, the plan is to stay with adopted mother, \"but we kind of butt-heads over things\". Wants to move in with friends by the end of the year who will support her sobriety. \"I would have my own level and I could have my dog.\"    Mood is \"better\". Thoughts of ending life \"are all gone... no thoughts of self harm.\" Believes time here has given her time and space to create a plan and process feelings.     The risks, benefits, alternatives and side effects of any medication changes have been discussed and are understood by the patient and other caregivers.    Review of systems:     ROS was negative unless noted above.          Allergies:   No Known Allergies         Psychiatric " "Examination:   /60 (BP Location: Left arm)   Pulse 60   Temp 98.6  F (37  C) (Oral)   Resp 16   Ht 1.651 m (5' 5\")   Wt 66.3 kg (146 lb 3.2 oz)   LMP 07/27/2020   SpO2 95%   Breastfeeding No   BMI 24.33 kg/m    Weight is 146 lbs 3.2 oz  Body mass index is 24.33 kg/m .    MENTAL STATUS EXAM    Appearance:  normal posture, normal gait, well-developed, well-nourished, appears stated age, good hygiene and appropriately dressed  Attitude:  cooperative, engaged and friendly  Psychomotor:  no evidence of tics, dystonia, or tardive dyskinesia  Eye Contact: appropriate  Speech:  fluent English  Mood: \"optimistic\" \"better\"  Affect:  congruent and appropriate; reactive (smiles/laughs at jokes/affirmations)  Thought Content: denies suicidal ideation, denies thoughts of self harm  Thought Process: linear, coherent and goal directed  Sensorium: awake and alert  Cognition: memory grossly intact, good concentration, good language ability, good attention span and good intelligence  Impulse control: fair  Insight: fair  Judgment: fair         Labs:     No results found for this or any previous visit (from the past 24 hour(s)).     Assessment    Principal Diagnosis:   #Major Depressive Disorder r/o Substance Induced Depressive Disorder    Secondary psychiatric diagnoses of concern this admission:   # Alcohol Use Disorder  # Polysubstance Use   # r/o Generalized Anxiety Disorder    Diagnostic Impression:   Isha Bloom is a 30 year old female with no previous psychiatric history. Patient presented to the ER 8/31/2020 with suicidal ideation in the context of psychosocial stressors and polysubstance use. Isha reported depressed mood, sleep disturbances, loss of interest, and trouble concentrating along with suicidal ideation and panic attacks, which she attributes to financial and social stressors; thus far she has attempted to cope with these symptoms by drinking alcohol and using other substances including cocaine. " Biologic factors contributing to her presentation include family history of addictive behavior as well as ongoing substance use. Psychosocial factors contributing to this presentation include recent job loss and ongoing pandemic/lockdown.     Her presentation is consistent with major depressive disorder without psychotic features and with concomitant polysubstance use disorder.       Psychiatric Hospital course:   Isha Bloom was admitted to [facility] as a voluntary patient. Patient presented with depressive symptoms and suicidal ideation. She was started on 20mg escitalopram PO every day for mood symptoms and monitored for side effects/tolerability. Patient experienced headaches and nausea as side effects, escitalopram was reduced to 10 mg PO every day on 9/2/2020.     Medical course   Patient was medically cleared for admission to [facility]. Patient has been followed with [facility] protocol and received a score of 2 on 9/1/2020, score of 1 on 9/2/2020. [facility] protocol discontinued on 9/2/2020.     Data:   - Alcohol breath test on 8/31/2020 was 0.291  - Urine toxicology on 8/31/2020 was positive for cocaine and ethanol   - CBC with platelets, taken 9/1/2020, was normal  - TSH, taken 9/1/2020, was normal  - Vitamin B12 level, taken 9/1/2020, was normal  - CMP, taken 9/1/2020 was normal.     Consults:   None.    Plan     Today's Changes:  - Coordinate transition to discharge tomorrow (9/4/2020)  - no medication changes    Psychotropic Medications:  Scheduled Psych Medications:  - 10 mg excitalopram PO QD    PRN Psych Medications  - Hydroxyzine 25 mg PRN Q4H  - Olanzapine 10 mg PO/IM prn Q2H severe agitation/psychosis  - Trazodone 50 mg PRN at bedtime  - Lorazepam 4 mg PRN Every 30 minutes    Patient will be treated in therapeutic milieu with appropriate individual and group therapies as described.    Medical diagnoses to be addressed this admission:    # Alcohol withdrawal  - [facility] protocol, discontinued 9/2/2020  following 48 hours low scores    Legal Status:   Orders Placed This Encounter      Voluntary      Safety Assessment:   Behavioral Orders   Procedures     Code 1 - Restrict to Unit     Code 3     Routine Programming     As clinically indicated     Status 15     Every 15 minutes.     Suicide precautions     Patients on Suicide Precautions should have a Combination Diet ordered that includes a Diet selection(s) AND a Behavioral Tray selection for Safe Tray - with utensils, or Safe Tray - NO utensils       Withdrawal precautions       Disposition: Patient presented with acute exacerbated depression requiring inpatient observation and management. Discharge to [facility], [place] and outpatient care 9/4/2020.    The patient was seen and the plan was discussed with the attending physician.     Nadia Vang, MS3      ---------------------------------------------------------------------------------------------------------------------  I was present with the medical student who participated in the service and in the documentation of the note. I have verified the history and personally performed the physical exam and medical decision making. I agree with the assessment and plan of care as documented in the note.    Isa Danielson MD  PGY1 Psychiatry      Psychiatry Attending Attestation:  I, Jeffrey Thayer, saw and evaluated the patient with the resident physician.  I agree with the findings and plan of care as documented in the resident note.  I have reviewed all labs and vital signs.     36.7